# Patient Record
Sex: MALE | Race: WHITE | NOT HISPANIC OR LATINO | URBAN - METROPOLITAN AREA
[De-identification: names, ages, dates, MRNs, and addresses within clinical notes are randomized per-mention and may not be internally consistent; named-entity substitution may affect disease eponyms.]

---

## 2019-01-03 ENCOUNTER — OFFICE (OUTPATIENT)
Dept: URBAN - METROPOLITAN AREA CLINIC 11 | Facility: CLINIC | Age: 19
End: 2019-01-03

## 2019-01-03 VITALS
DIASTOLIC BLOOD PRESSURE: 76 MMHG | SYSTOLIC BLOOD PRESSURE: 117 MMHG | WEIGHT: 175 LBS | HEART RATE: 80 BPM | HEIGHT: 69 IN

## 2019-01-03 DIAGNOSIS — K50.90 CROHN'S DISEASE, UNSPECIFIED, WITHOUT COMPLICATIONS: ICD-10-CM

## 2019-01-03 LAB
C-REACTIVE PROTEIN, QUANT: 8.4 MG/L — HIGH (ref 0–4.9)
CBC, PLATELET, NO DIFFERENTIAL: HEMATOCRIT: 42.8 % (ref 37.5–51)
CBC, PLATELET, NO DIFFERENTIAL: HEMOGLOBIN: 13.5 G/DL (ref 13–17.7)
CBC, PLATELET, NO DIFFERENTIAL: MCH: 27.1 PG (ref 26.6–33)
CBC, PLATELET, NO DIFFERENTIAL: MCHC: 31.5 G/DL (ref 31.5–35.7)
CBC, PLATELET, NO DIFFERENTIAL: MCV: 86 FL (ref 79–97)
CBC, PLATELET, NO DIFFERENTIAL: PLATELETS: 270 X10E3/UL (ref 150–379)
CBC, PLATELET, NO DIFFERENTIAL: RBC: 4.98 X10E6/UL (ref 4.14–5.8)
CBC, PLATELET, NO DIFFERENTIAL: RDW: 17.7 % — HIGH (ref 12.3–15.4)
CBC, PLATELET, NO DIFFERENTIAL: WBC: 10 X10E3/UL (ref 3.4–10.8)
COMP. METABOLIC PANEL (14): A/G RATIO: 1.8 (ref 1.2–2.2)
COMP. METABOLIC PANEL (14): ALBUMIN: 4.6 G/DL (ref 3.5–5.5)
COMP. METABOLIC PANEL (14): ALKALINE PHOSPHATASE: 102 IU/L (ref 39–117)
COMP. METABOLIC PANEL (14): ALT (SGPT): 52 IU/L — HIGH (ref 0–44)
COMP. METABOLIC PANEL (14): AST (SGOT): 32 IU/L (ref 0–40)
COMP. METABOLIC PANEL (14): BILIRUBIN, TOTAL: 0.3 MG/DL (ref 0–1.2)
COMP. METABOLIC PANEL (14): BUN/CREATININE RATIO: 11 (ref 9–20)
COMP. METABOLIC PANEL (14): BUN: 10 MG/DL (ref 6–20)
COMP. METABOLIC PANEL (14): CALCIUM: 10.1 MG/DL (ref 8.7–10.2)
COMP. METABOLIC PANEL (14): CARBON DIOXIDE, TOTAL: 23 MMOL/L (ref 20–29)
COMP. METABOLIC PANEL (14): CHLORIDE: 104 MMOL/L (ref 96–106)
COMP. METABOLIC PANEL (14): CREATININE: 0.89 MG/DL (ref 0.76–1.27)
COMP. METABOLIC PANEL (14): EGFR IF AFRICN AM: 142 ML/MIN/1.73 (ref 59–?)
COMP. METABOLIC PANEL (14): EGFR IF NONAFRICN AM: 123 ML/MIN/1.73 (ref 59–?)
COMP. METABOLIC PANEL (14): GLOBULIN, TOTAL: 2.6 G/DL (ref 1.5–4.5)
COMP. METABOLIC PANEL (14): GLUCOSE: 87 MG/DL (ref 65–99)
COMP. METABOLIC PANEL (14): POTASSIUM: 5 MMOL/L (ref 3.5–5.2)
COMP. METABOLIC PANEL (14): PROTEIN, TOTAL: 7.2 G/DL (ref 6–8.5)
COMP. METABOLIC PANEL (14): SODIUM: 142 MMOL/L (ref 134–144)
HBSAG SCREEN: NEGATIVE
QUANTIFERON-TB GOLD PLUS: NEGATIVE
QUANTIFERON-TB GOLD PLUS: QUANTIFERON CRITERIA: (no result)
QUANTIFERON-TB GOLD PLUS: QUANTIFERON INCUBATION: (no result)
QUANTIFERON-TB GOLD PLUS: QUANTIFERON MITOGEN VALUE: 1.04 IU/ML
QUANTIFERON-TB GOLD PLUS: QUANTIFERON NIL VALUE: 0.05 IU/ML
QUANTIFERON-TB GOLD PLUS: QUANTIFERON TB1 AG VALUE: 0.03 IU/ML
QUANTIFERON-TB GOLD PLUS: QUANTIFERON TB2 AG VALUE: 0.05 IU/ML
SEDIMENTATION RATE-WESTERGREN: 9 MM/HR (ref 0–15)

## 2019-01-03 PROCEDURE — 99244 OFF/OP CNSLTJ NEW/EST MOD 40: CPT | Performed by: INTERNAL MEDICINE

## 2019-01-03 RX ORDER — PREDNISONE 10 MG/1
10 TABLET ORAL
Qty: 20 | Refills: 0 | Status: COMPLETED
End: 2019-01-03

## 2019-01-03 RX ORDER — PREDNISONE 10 MG/1
TABLET ORAL
Qty: 49 | Refills: 0 | Status: COMPLETED
Start: 2019-01-03 | End: 2019-02-13

## 2019-01-15 ENCOUNTER — OFFICE (OUTPATIENT)
Dept: URBAN - METROPOLITAN AREA CLINIC 11 | Facility: CLINIC | Age: 19
End: 2019-01-15

## 2019-01-15 DIAGNOSIS — K50.90 CROHN'S DISEASE, UNSPECIFIED, WITHOUT COMPLICATIONS: ICD-10-CM

## 2019-01-15 PROCEDURE — 96413 CHEMO IV INFUSION 1 HR: CPT | Performed by: INTERNAL MEDICINE

## 2019-01-29 ENCOUNTER — OFFICE (OUTPATIENT)
Dept: URBAN - METROPOLITAN AREA CLINIC 11 | Facility: CLINIC | Age: 19
End: 2019-01-29

## 2019-01-29 DIAGNOSIS — K50.90 CROHN'S DISEASE, UNSPECIFIED, WITHOUT COMPLICATIONS: ICD-10-CM

## 2019-01-29 PROCEDURE — 96413 CHEMO IV INFUSION 1 HR: CPT | Performed by: INTERNAL MEDICINE

## 2019-02-13 ENCOUNTER — OFFICE (OUTPATIENT)
Dept: URBAN - METROPOLITAN AREA CLINIC 11 | Facility: CLINIC | Age: 19
End: 2019-02-13

## 2019-02-13 VITALS
SYSTOLIC BLOOD PRESSURE: 140 MMHG | HEIGHT: 69 IN | HEART RATE: 106 BPM | WEIGHT: 187 LBS | DIASTOLIC BLOOD PRESSURE: 90 MMHG

## 2019-02-13 DIAGNOSIS — K50.90 CROHN'S DISEASE, UNSPECIFIED, WITHOUT COMPLICATIONS: ICD-10-CM

## 2019-02-13 LAB
BASIC METABOLIC PANEL (8): BUN/CREATININE RATIO: 14 (ref 9–20)
BASIC METABOLIC PANEL (8): BUN: 11 MG/DL (ref 6–20)
BASIC METABOLIC PANEL (8): CALCIUM: 9.9 MG/DL (ref 8.7–10.2)
BASIC METABOLIC PANEL (8): CARBON DIOXIDE, TOTAL: 24 MMOL/L (ref 20–29)
BASIC METABOLIC PANEL (8): CHLORIDE: 98 MMOL/L (ref 96–106)
BASIC METABOLIC PANEL (8): CREATININE: 0.79 MG/DL (ref 0.76–1.27)
BASIC METABOLIC PANEL (8): EGFR IF AFRICN AM: 148 ML/MIN/1.73 (ref 59–?)
BASIC METABOLIC PANEL (8): EGFR IF NONAFRICN AM: 128 ML/MIN/1.73 (ref 59–?)
BASIC METABOLIC PANEL (8): GLUCOSE: 77 MG/DL (ref 65–99)
BASIC METABOLIC PANEL (8): POTASSIUM: 4.8 MMOL/L (ref 3.5–5.2)
BASIC METABOLIC PANEL (8): SODIUM: 138 MMOL/L (ref 134–144)
C-REACTIVE PROTEIN, QUANT: 16.8 MG/L — HIGH (ref 0–4.9)

## 2019-02-13 PROCEDURE — 99213 OFFICE O/P EST LOW 20 MIN: CPT | Performed by: INTERNAL MEDICINE

## 2019-02-13 RX ORDER — OMEPRAZOLE 20 MG/1
20 CAPSULE, DELAYED RELEASE ORAL
Qty: 30 | Refills: 11 | Status: COMPLETED
Start: 2019-02-13 | End: 2020-06-22

## 2019-02-13 RX ORDER — NAPROXEN SODIUM 275 MG/1
550 TABLET ORAL
Qty: 60 | Refills: 6 | Status: COMPLETED
Start: 2019-02-13 | End: 2020-06-22

## 2019-02-26 ENCOUNTER — OFFICE (OUTPATIENT)
Dept: URBAN - METROPOLITAN AREA CLINIC 11 | Facility: CLINIC | Age: 19
End: 2019-02-26

## 2019-02-26 DIAGNOSIS — K50.90 CROHN'S DISEASE, UNSPECIFIED, WITHOUT COMPLICATIONS: ICD-10-CM

## 2019-02-26 PROCEDURE — 96413 CHEMO IV INFUSION 1 HR: CPT | Performed by: INTERNAL MEDICINE

## 2019-04-17 ENCOUNTER — OFFICE (OUTPATIENT)
Dept: URBAN - METROPOLITAN AREA CLINIC 11 | Facility: CLINIC | Age: 19
End: 2019-04-17

## 2019-04-17 VITALS
HEART RATE: 87 BPM | DIASTOLIC BLOOD PRESSURE: 68 MMHG | SYSTOLIC BLOOD PRESSURE: 148 MMHG | WEIGHT: 201 LBS | HEIGHT: 68 IN

## 2019-04-17 DIAGNOSIS — M25.50 PAIN IN UNSPECIFIED JOINT: ICD-10-CM

## 2019-04-17 DIAGNOSIS — K50.90 CROHN'S DISEASE, UNSPECIFIED, WITHOUT COMPLICATIONS: ICD-10-CM

## 2019-04-17 DIAGNOSIS — R10.9 UNSPECIFIED ABDOMINAL PAIN: ICD-10-CM

## 2019-04-17 LAB
BILIRUBIN, DIRECT: 0.09 MG/DL (ref 0–0.4)
C-REACTIVE PROTEIN, QUANT: 10.2 MG/L — HIGH (ref 0–4.9)
CBC, PLATELET, NO DIFFERENTIAL: HEMATOCRIT: 33.6 % — LOW (ref 37.5–51)
CBC, PLATELET, NO DIFFERENTIAL: HEMOGLOBIN: 10.7 G/DL — LOW (ref 13–17.7)
CBC, PLATELET, NO DIFFERENTIAL: MCH: 25.5 PG — LOW (ref 26.6–33)
CBC, PLATELET, NO DIFFERENTIAL: MCHC: 31.8 G/DL (ref 31.5–35.7)
CBC, PLATELET, NO DIFFERENTIAL: MCV: 80 FL (ref 79–97)
CBC, PLATELET, NO DIFFERENTIAL: PLATELETS: 298 X10E3/UL (ref 150–379)
CBC, PLATELET, NO DIFFERENTIAL: RBC: 4.19 X10E6/UL (ref 4.14–5.8)
CBC, PLATELET, NO DIFFERENTIAL: RDW: 13.5 % (ref 12.3–15.4)
CBC, PLATELET, NO DIFFERENTIAL: WBC: 8 X10E3/UL (ref 3.4–10.8)
COMP. METABOLIC PANEL (14): A/G RATIO: 1.6 (ref 1.2–2.2)
COMP. METABOLIC PANEL (14): ALBUMIN: 4.2 G/DL (ref 3.5–5.5)
COMP. METABOLIC PANEL (14): ALKALINE PHOSPHATASE: 85 IU/L (ref 39–117)
COMP. METABOLIC PANEL (14): ALT (SGPT): 15 IU/L (ref 0–44)
COMP. METABOLIC PANEL (14): AST (SGOT): 22 IU/L (ref 0–40)
COMP. METABOLIC PANEL (14): BILIRUBIN, TOTAL: 0.3 MG/DL (ref 0–1.2)
COMP. METABOLIC PANEL (14): BUN/CREATININE RATIO: 14 (ref 9–20)
COMP. METABOLIC PANEL (14): BUN: 12 MG/DL (ref 6–20)
COMP. METABOLIC PANEL (14): CALCIUM: 9.3 MG/DL (ref 8.7–10.2)
COMP. METABOLIC PANEL (14): CARBON DIOXIDE, TOTAL: 21 MMOL/L (ref 20–29)
COMP. METABOLIC PANEL (14): CHLORIDE: 105 MMOL/L (ref 96–106)
COMP. METABOLIC PANEL (14): CREATININE: 0.87 MG/DL (ref 0.76–1.27)
COMP. METABOLIC PANEL (14): EGFR IF AFRICN AM: 143 ML/MIN/1.73 (ref 59–?)
COMP. METABOLIC PANEL (14): EGFR IF NONAFRICN AM: 123 ML/MIN/1.73 (ref 59–?)
COMP. METABOLIC PANEL (14): GLOBULIN, TOTAL: 2.7 G/DL (ref 1.5–4.5)
COMP. METABOLIC PANEL (14): GLUCOSE: 95 MG/DL (ref 65–99)
COMP. METABOLIC PANEL (14): POTASSIUM: 4.2 MMOL/L (ref 3.5–5.2)
COMP. METABOLIC PANEL (14): PROTEIN, TOTAL: 6.9 G/DL (ref 6–8.5)
COMP. METABOLIC PANEL (14): SODIUM: 143 MMOL/L (ref 134–144)
SEDIMENTATION RATE-WESTERGREN: 18 MM/HR — HIGH (ref 0–15)

## 2019-04-17 PROCEDURE — 99213 OFFICE O/P EST LOW 20 MIN: CPT | Performed by: INTERNAL MEDICINE

## 2019-04-23 ENCOUNTER — OFFICE (OUTPATIENT)
Dept: URBAN - METROPOLITAN AREA CLINIC 11 | Facility: CLINIC | Age: 19
End: 2019-04-23
Payer: COMMERCIAL

## 2019-04-23 DIAGNOSIS — K50.90 CROHN'S DISEASE, UNSPECIFIED, WITHOUT COMPLICATIONS: ICD-10-CM

## 2019-04-23 PROCEDURE — 96413 CHEMO IV INFUSION 1 HR: CPT | Performed by: INTERNAL MEDICINE

## 2019-04-29 ENCOUNTER — OFFICE (OUTPATIENT)
Dept: URBAN - METROPOLITAN AREA CLINIC 22 | Facility: CLINIC | Age: 19
End: 2019-04-29
Payer: COMMERCIAL

## 2019-04-29 DIAGNOSIS — R10.9 UNSPECIFIED ABDOMINAL PAIN: ICD-10-CM

## 2019-04-29 DIAGNOSIS — K50.90 CROHN'S DISEASE, UNSPECIFIED, WITHOUT COMPLICATIONS: ICD-10-CM

## 2019-04-29 PROCEDURE — 74177 CT ABD & PELVIS W/CONTRAST: CPT | Performed by: INTERNAL MEDICINE

## 2019-05-13 ENCOUNTER — OFFICE (OUTPATIENT)
Dept: URBAN - METROPOLITAN AREA CLINIC 11 | Facility: CLINIC | Age: 19
End: 2019-05-13
Payer: COMMERCIAL

## 2019-05-13 DIAGNOSIS — D50.9 IRON DEFICIENCY ANEMIA, UNSPECIFIED: ICD-10-CM

## 2019-05-13 DIAGNOSIS — T45.4X5A ADVERSE EFFECT OF IRON AND ITS COMPOUNDS, INITIAL ENCOUNTER: ICD-10-CM

## 2019-05-13 PROCEDURE — 96365 THER/PROPH/DIAG IV INF INIT: CPT | Performed by: INTERNAL MEDICINE

## 2019-05-20 ENCOUNTER — OFFICE (OUTPATIENT)
Dept: URBAN - METROPOLITAN AREA CLINIC 11 | Facility: CLINIC | Age: 19
End: 2019-05-20
Payer: COMMERCIAL

## 2019-05-20 DIAGNOSIS — T45.4X5A ADVERSE EFFECT OF IRON AND ITS COMPOUNDS, INITIAL ENCOUNTER: ICD-10-CM

## 2019-05-20 DIAGNOSIS — D50.9 IRON DEFICIENCY ANEMIA, UNSPECIFIED: ICD-10-CM

## 2019-05-20 PROCEDURE — 96365 THER/PROPH/DIAG IV INF INIT: CPT | Performed by: INTERNAL MEDICINE

## 2019-06-18 ENCOUNTER — OFFICE (OUTPATIENT)
Dept: URBAN - METROPOLITAN AREA CLINIC 11 | Facility: CLINIC | Age: 19
End: 2019-06-18

## 2019-06-18 DIAGNOSIS — K50.90 CROHN'S DISEASE, UNSPECIFIED, WITHOUT COMPLICATIONS: ICD-10-CM

## 2019-06-18 PROCEDURE — 96413 CHEMO IV INFUSION 1 HR: CPT | Performed by: INTERNAL MEDICINE

## 2019-08-12 ENCOUNTER — OFFICE (OUTPATIENT)
Dept: URBAN - METROPOLITAN AREA CLINIC 11 | Facility: CLINIC | Age: 19
End: 2019-08-12

## 2019-08-12 ENCOUNTER — OFFICE (OUTPATIENT)
Dept: URBAN - METROPOLITAN AREA CLINIC 11 | Facility: CLINIC | Age: 19
End: 2019-08-12
Payer: COMMERCIAL

## 2019-08-12 DIAGNOSIS — K50.90 CROHN'S DISEASE, UNSPECIFIED, WITHOUT COMPLICATIONS: ICD-10-CM

## 2019-08-12 PROCEDURE — 96413 CHEMO IV INFUSION 1 HR: CPT | Performed by: INTERNAL MEDICINE

## 2019-08-14 ENCOUNTER — OFFICE (OUTPATIENT)
Dept: URBAN - METROPOLITAN AREA CLINIC 11 | Facility: CLINIC | Age: 19
End: 2019-08-14

## 2019-08-14 VITALS
SYSTOLIC BLOOD PRESSURE: 146 MMHG | HEIGHT: 68 IN | DIASTOLIC BLOOD PRESSURE: 77 MMHG | WEIGHT: 185 LBS | HEART RATE: 86 BPM

## 2019-08-14 DIAGNOSIS — M25.50 PAIN IN UNSPECIFIED JOINT: ICD-10-CM

## 2019-08-14 DIAGNOSIS — R19.7 DIARRHEA, UNSPECIFIED: ICD-10-CM

## 2019-08-14 DIAGNOSIS — K50.90 CROHN'S DISEASE, UNSPECIFIED, WITHOUT COMPLICATIONS: ICD-10-CM

## 2019-08-14 DIAGNOSIS — R10.9 UNSPECIFIED ABDOMINAL PAIN: ICD-10-CM

## 2019-08-14 PROCEDURE — 99213 OFFICE O/P EST LOW 20 MIN: CPT | Performed by: INTERNAL MEDICINE

## 2019-08-14 RX ORDER — CHOLESTYRAMINE 4 G/9G
4 POWDER, FOR SUSPENSION ORAL
Qty: 30 | Refills: 5 | Status: COMPLETED
Start: 2019-08-14 | End: 2020-06-22

## 2019-08-14 RX ORDER — SODIUM PICOSULFATE, MAGNESIUM OXIDE, AND ANHYDROUS CITRIC ACID 10; 3.5; 12 MG/160ML; G/160ML; G/160ML
LIQUID ORAL
Qty: 1 | Refills: 0 | Status: COMPLETED
Start: 2019-08-14 | End: 2019-08-26

## 2019-08-20 LAB
C DIFFICILE TOXINS A+B, EIA: NEGATIVE
GIARDIA, EIA, OVA/PARASITE: GIARDIA LAMBLIA AG, EIA: NEGATIVE
GIARDIA, EIA, OVA/PARASITE: OVA + PARASITE EXAM: (no result)
LACTOFERRIN, FECAL, QUANT.: 5.5 UG/ML(G) (ref 0–7.24)
RESULT: RESULT 1: (no result)

## 2019-08-26 ENCOUNTER — OFFICE (OUTPATIENT)
Dept: URBAN - METROPOLITAN AREA PATHOLOGY 22 | Facility: PATHOLOGY | Age: 19
End: 2019-08-26
Payer: COMMERCIAL

## 2019-08-26 ENCOUNTER — AMBULATORY SURGICAL CENTER (OUTPATIENT)
Dept: URBAN - METROPOLITAN AREA SURGERY 3 | Facility: SURGERY | Age: 19
End: 2019-08-26
Payer: COMMERCIAL

## 2019-08-26 VITALS
DIASTOLIC BLOOD PRESSURE: 69 MMHG | DIASTOLIC BLOOD PRESSURE: 62 MMHG | DIASTOLIC BLOOD PRESSURE: 64 MMHG | TEMPERATURE: 98 F | SYSTOLIC BLOOD PRESSURE: 115 MMHG | HEART RATE: 105 BPM | SYSTOLIC BLOOD PRESSURE: 115 MMHG | DIASTOLIC BLOOD PRESSURE: 69 MMHG | HEART RATE: 98 BPM | DIASTOLIC BLOOD PRESSURE: 64 MMHG | RESPIRATION RATE: 18 BRPM | HEART RATE: 79 BPM | OXYGEN SATURATION: 95 % | SYSTOLIC BLOOD PRESSURE: 116 MMHG | RESPIRATION RATE: 16 BRPM | SYSTOLIC BLOOD PRESSURE: 114 MMHG | TEMPERATURE: 98 F | WEIGHT: 182 LBS | RESPIRATION RATE: 16 BRPM | SYSTOLIC BLOOD PRESSURE: 115 MMHG | SYSTOLIC BLOOD PRESSURE: 117 MMHG | RESPIRATION RATE: 14 BRPM | HEART RATE: 98 BPM | TEMPERATURE: 98 F | RESPIRATION RATE: 14 BRPM | SYSTOLIC BLOOD PRESSURE: 117 MMHG | HEART RATE: 79 BPM | DIASTOLIC BLOOD PRESSURE: 62 MMHG | SYSTOLIC BLOOD PRESSURE: 117 MMHG | HEART RATE: 97 BPM | RESPIRATION RATE: 14 BRPM | RESPIRATION RATE: 16 BRPM | DIASTOLIC BLOOD PRESSURE: 71 MMHG | RESPIRATION RATE: 20 BRPM | WEIGHT: 182 LBS | SYSTOLIC BLOOD PRESSURE: 114 MMHG | RESPIRATION RATE: 20 BRPM | SYSTOLIC BLOOD PRESSURE: 124 MMHG | TEMPERATURE: 97.4 F | SYSTOLIC BLOOD PRESSURE: 116 MMHG | SYSTOLIC BLOOD PRESSURE: 114 MMHG | RESPIRATION RATE: 18 BRPM | HEART RATE: 82 BPM | DIASTOLIC BLOOD PRESSURE: 62 MMHG | DIASTOLIC BLOOD PRESSURE: 71 MMHG | RESPIRATION RATE: 20 BRPM | TEMPERATURE: 97.4 F | SYSTOLIC BLOOD PRESSURE: 124 MMHG | SYSTOLIC BLOOD PRESSURE: 116 MMHG | HEART RATE: 98 BPM | HEART RATE: 82 BPM | TEMPERATURE: 97.4 F | DIASTOLIC BLOOD PRESSURE: 64 MMHG | HEIGHT: 71 IN | OXYGEN SATURATION: 95 % | HEIGHT: 71 IN | OXYGEN SATURATION: 100 % | WEIGHT: 182 LBS | SYSTOLIC BLOOD PRESSURE: 124 MMHG | DIASTOLIC BLOOD PRESSURE: 69 MMHG | HEIGHT: 71 IN | DIASTOLIC BLOOD PRESSURE: 71 MMHG | OXYGEN SATURATION: 100 % | HEART RATE: 105 BPM | HEART RATE: 79 BPM | HEART RATE: 97 BPM | OXYGEN SATURATION: 100 % | RESPIRATION RATE: 18 BRPM | OXYGEN SATURATION: 95 % | HEART RATE: 105 BPM | HEART RATE: 82 BPM | HEART RATE: 97 BPM

## 2019-08-26 DIAGNOSIS — K64.0 FIRST DEGREE HEMORRHOIDS: ICD-10-CM

## 2019-08-26 DIAGNOSIS — T18.4XXA FOREIGN BODY IN COLON, INITIAL ENCOUNTER: ICD-10-CM

## 2019-08-26 DIAGNOSIS — K50.90 CROHN'S DISEASE, UNSPECIFIED, WITHOUT COMPLICATIONS: ICD-10-CM

## 2019-08-26 PROBLEM — K52.89 INFLAMMATORY BOWEL DISEASE OF THE INTESTINE IF MORE PRECISE DIAGNOSIS OR DETERMINATION OF THE EXTENT/SEVERITY OF ACTIVITY OF DISEASE WILL INFLUENCE IMMEDIATE/FUTURE MANAGEMENT: Status: ACTIVE | Noted: 2019-08-26

## 2019-08-26 PROCEDURE — 88342 IMHCHEM/IMCYTCHM 1ST ANTB: CPT | Performed by: INTERNAL MEDICINE

## 2019-08-26 PROCEDURE — 45379 COLONOSCOPY W/FB REMOVAL: CPT | Mod: 59 | Performed by: INTERNAL MEDICINE

## 2019-08-26 PROCEDURE — 88305 TISSUE EXAM BY PATHOLOGIST: CPT | Performed by: INTERNAL MEDICINE

## 2019-08-26 PROCEDURE — 45380 COLONOSCOPY AND BIOPSY: CPT | Performed by: INTERNAL MEDICINE

## 2019-08-26 NOTE — SERVICEHPINOTES
Mr. Aviles is a 20yo M that presents for follow-up regarding a complicated course for Crohn's disease. He establish care to see me in early January 2019. At that time he had recently undergone bowel resection for Crohn's disease in [place] and was presenting to establish care in [place]. He also was seeing Dr. Mayer regarding reversal of his ostomy. I spoke with Dr. Mayer and ultimately we decided to go ahead and continue his biologic therapy. Prior to his discharge from the hospital in [place] he had been started on Entyvio. He had previously been on Humira and Remicade and seemingly failed these. Therefore, we continue this and he has been on the entyvio. He has been doing well other than some ongoing joint pain. I suspect his joint symptoms had previously been controlled with his anti TNF however now on the Entyvio, given its more GI specific mechanism of action, his joint symptoms have not been well controlled. He did undergo reversal of his ostomy in May of this year. I do not have those records but apparently he says everything went well. Since that time he has had 5-6 bowel movements per day. He denies any blood in his stool. He denies any nocturnal bowel movements. He generally feels fatigued. He does take NSAIDs. BR

## 2019-08-26 NOTE — SERVICEHPINOTES
Mr. Aviles is a 20yo M that presents for follow-up regarding a complicated course for Crohn's disease. He establish care to see me in early January 2019. At that time he had recently undergone bowel resection for Crohn's disease in [place] and was presenting to establish care in [place]. He also was seeing Dr. Mayer regarding reversal of his ostomy. I spoke with Dr. Mayer and ultimately we decided to go ahead and continue his biologic therapy. Prior to his discharge from the hospital in [place] he had been started on Entyvio. He had previously been on Humira and Remicade and seemingly failed these. Therefore, we continue this and he has been on the entyvio. He has been doing well other than some ongoing joint pain. I suspect his joint symptoms had previously been controlled with his anti TNF however now on the Entyvio, given its more GI specific mechanism of action, his joint symptoms have not been well controlled. He did undergo reversal of his ostomy in May of this year. I do not have those records but apparently he says everything went well. Since that time he has had 5-6 bowel movements per day. He denies any blood in his stool. He denies any nocturnal bowel movements. He generally feels fatigued. He does take NSAIDs. BR

## 2019-08-26 NOTE — SERVICEHPINOTES
Mr. Aviles is a 18yo M that presents for follow-up regarding a complicated course for Crohn's disease. He establish care to see me in early January 2019. At that time he had recently undergone bowel resection for Crohn's disease in [place] and was presenting to establish care in [place]. He also was seeing Dr. Mayer regarding reversal of his ostomy. I spoke with Dr. Mayer and ultimately we decided to go ahead and continue his biologic therapy. Prior to his discharge from the hospital in [place] he had been started on Entyvio. He had previously been on Humira and Remicade and seemingly failed these. Therefore, we continue this and he has been on the entyvio. He has been doing well other than some ongoing joint pain. I suspect his joint symptoms had previously been controlled with his anti TNF however now on the Entyvio, given its more GI specific mechanism of action, his joint symptoms have not been well controlled. He did undergo reversal of his ostomy in May of this year. I do not have those records but apparently he says everything went well. Since that time he has had 5-6 bowel movements per day. He denies any blood in his stool. He denies any nocturnal bowel movements. He generally feels fatigued. He does take NSAIDs. BR

## 2019-10-10 ENCOUNTER — OFFICE (OUTPATIENT)
Dept: URBAN - METROPOLITAN AREA CLINIC 11 | Facility: CLINIC | Age: 19
End: 2019-10-10
Payer: COMMERCIAL

## 2019-10-10 DIAGNOSIS — K50.90 CROHN'S DISEASE, UNSPECIFIED, WITHOUT COMPLICATIONS: ICD-10-CM

## 2019-10-10 PROCEDURE — 96413 CHEMO IV INFUSION 1 HR: CPT | Performed by: INTERNAL MEDICINE

## 2019-12-05 ENCOUNTER — OFFICE (OUTPATIENT)
Dept: URBAN - METROPOLITAN AREA CLINIC 11 | Facility: CLINIC | Age: 19
End: 2019-12-05
Payer: COMMERCIAL

## 2019-12-05 DIAGNOSIS — K50.90 CROHN'S DISEASE, UNSPECIFIED, WITHOUT COMPLICATIONS: ICD-10-CM

## 2019-12-05 PROCEDURE — 96413 CHEMO IV INFUSION 1 HR: CPT | Performed by: INTERNAL MEDICINE

## 2020-01-07 ENCOUNTER — OFFICE (OUTPATIENT)
Dept: URBAN - METROPOLITAN AREA CLINIC 11 | Facility: CLINIC | Age: 20
End: 2020-01-07

## 2020-01-07 VITALS
WEIGHT: 191 LBS | DIASTOLIC BLOOD PRESSURE: 69 MMHG | SYSTOLIC BLOOD PRESSURE: 116 MMHG | HEART RATE: 78 BPM | HEIGHT: 71 IN

## 2020-01-07 DIAGNOSIS — R19.4 CHANGE IN BOWEL HABIT: ICD-10-CM

## 2020-01-07 DIAGNOSIS — K42.9 UMBILICAL HERNIA WITHOUT OBSTRUCTION OR GANGRENE: ICD-10-CM

## 2020-01-07 DIAGNOSIS — K50.90 CROHN'S DISEASE, UNSPECIFIED, WITHOUT COMPLICATIONS: ICD-10-CM

## 2020-01-07 DIAGNOSIS — M25.50 PAIN IN UNSPECIFIED JOINT: ICD-10-CM

## 2020-01-07 PROCEDURE — 99213 OFFICE O/P EST LOW 20 MIN: CPT | Performed by: INTERNAL MEDICINE

## 2020-01-25 ENCOUNTER — INPATIENT HOSPITAL (OUTPATIENT)
Dept: URBAN - METROPOLITAN AREA HOSPITAL 95 | Facility: HOSPITAL | Age: 20
End: 2020-01-25
Payer: COMMERCIAL

## 2020-01-25 DIAGNOSIS — K50.012 CROHN'S DISEASE OF SMALL INTESTINE WITH INTESTINAL OBSTRUCTI: ICD-10-CM

## 2020-01-25 PROCEDURE — 99221 1ST HOSP IP/OBS SF/LOW 40: CPT | Performed by: SPECIALIST

## 2020-01-26 PROCEDURE — 99231 SBSQ HOSP IP/OBS SF/LOW 25: CPT | Performed by: SPECIALIST

## 2020-01-28 ENCOUNTER — OFFICE (OUTPATIENT)
Dept: URBAN - METROPOLITAN AREA CLINIC 11 | Facility: CLINIC | Age: 20
End: 2020-01-28
Payer: COMMERCIAL

## 2020-01-28 ENCOUNTER — OFFICE (OUTPATIENT)
Dept: URBAN - METROPOLITAN AREA CLINIC 11 | Facility: CLINIC | Age: 20
End: 2020-01-28

## 2020-01-28 VITALS
HEIGHT: 71 IN | WEIGHT: 193 LBS | DIASTOLIC BLOOD PRESSURE: 76 MMHG | SYSTOLIC BLOOD PRESSURE: 135 MMHG | HEART RATE: 78 BPM

## 2020-01-28 DIAGNOSIS — M25.50 PAIN IN UNSPECIFIED JOINT: ICD-10-CM

## 2020-01-28 DIAGNOSIS — R19.7 DIARRHEA, UNSPECIFIED: ICD-10-CM

## 2020-01-28 DIAGNOSIS — K50.90 CROHN'S DISEASE, UNSPECIFIED, WITHOUT COMPLICATIONS: ICD-10-CM

## 2020-01-28 DIAGNOSIS — K42.9 UMBILICAL HERNIA WITHOUT OBSTRUCTION OR GANGRENE: ICD-10-CM

## 2020-01-28 LAB
C DIFFICILE TOXINS A+B, EIA: NEGATIVE
RESULT: RESULT 1: (no result)
RESULT: RESULT 1: (no result)
STOOL CULTURE: CAMPYLOBACTER CULTURE: (no result)
STOOL CULTURE: E COLI SHIGA TOXIN EIA: NEGATIVE
STOOL CULTURE: SALMONELLA/SHIGELLA SCREEN: (no result)

## 2020-01-28 PROCEDURE — 96413 CHEMO IV INFUSION 1 HR: CPT | Performed by: INTERNAL MEDICINE

## 2020-01-28 PROCEDURE — 99213 OFFICE O/P EST LOW 20 MIN: CPT | Mod: 25 | Performed by: INTERNAL MEDICINE

## 2020-01-28 RX ORDER — PREDNISONE 10 MG/1
TABLET ORAL
Qty: 119 | Refills: 0 | Status: COMPLETED
Start: 2020-01-28 | End: 2020-06-22

## 2020-03-25 ENCOUNTER — OFFICE (OUTPATIENT)
Dept: URBAN - METROPOLITAN AREA CLINIC 11 | Facility: CLINIC | Age: 20
End: 2020-03-25
Payer: COMMERCIAL

## 2020-03-25 DIAGNOSIS — K50.90 CROHN'S DISEASE, UNSPECIFIED, WITHOUT COMPLICATIONS: ICD-10-CM

## 2020-03-25 PROCEDURE — 96413 CHEMO IV INFUSION 1 HR: CPT | Performed by: INTERNAL MEDICINE

## 2020-05-11 ENCOUNTER — OFFICE (OUTPATIENT)
Dept: URBAN - METROPOLITAN AREA CLINIC 11 | Facility: CLINIC | Age: 20
End: 2020-05-11
Payer: COMMERCIAL

## 2020-05-11 DIAGNOSIS — K50.90 CROHN'S DISEASE, UNSPECIFIED, WITHOUT COMPLICATIONS: ICD-10-CM

## 2020-05-11 PROCEDURE — 96413 CHEMO IV INFUSION 1 HR: CPT | Performed by: INTERNAL MEDICINE

## 2020-06-22 ENCOUNTER — OFFICE (OUTPATIENT)
Dept: URBAN - METROPOLITAN AREA TELEHEALTH 10 | Facility: TELEHEALTH | Age: 20
End: 2020-06-22

## 2020-06-22 VITALS — HEIGHT: 71 IN

## 2020-06-22 DIAGNOSIS — R19.7 DIARRHEA, UNSPECIFIED: ICD-10-CM

## 2020-06-22 DIAGNOSIS — K50.90 CROHN'S DISEASE, UNSPECIFIED, WITHOUT COMPLICATIONS: ICD-10-CM

## 2020-06-22 DIAGNOSIS — M25.50 PAIN IN UNSPECIFIED JOINT: ICD-10-CM

## 2020-06-22 NOTE — SERVICEHPINOTES
Mr. Aviles is a 21yo M that presents for follow-up regarding a complicated course for Crohn's disease. A synopsis of his history is as outlined below.  Most recently he was seen at [facility] for a 2nd opinion and it was recommended his Entyvio be increased to every 6 weeks.  He had antibody levels checked and these were negative.  So far he has received one infusion so far at this time interval and is due for his 2nd infusion soon.  However, he currently is working in [facility] and is working on establishing with a gastroenterologist there so he can get his infusions.  He reports that overall he says he is doing well. He has had some more frequent stools and no longer having issues with constipation. However he has noticed some urgency.  He will occasionally see some blood with wiping.  He says he had similar symptoms in the past and did a course of mesalamine suppositories which led to resolution of his symptoms.  He denies any abdominal pain or gary watery diarrhea.  We discussed MR enterography in the past and this was also recommended to him at [facility] however this has not yet been obtained. He was going to have this performed here but then it was ordered at [facility] so he was going to have it performed there, however due to the COVID-19 pandemic he did not travel to [facility] and therefore has not had this test performed as of yet.  PRIOR HISTORY:BRIt appears he was 1st diagnosed with Crohn's disease in 2011 around age 11. At that time, this was diagnosed on colonoscopy. It appears he had colonic involvement although later it was mentioned he also possibly had gastric involvement as well. Regardless he was on several different types of medications including mesalamine, prednisone, methotrexate, and Imuran at different points. It appears he never responded exceedingly well to these but ultimately was started on Remicade. Apparently he had a very good response to Remicade was on this for several years and his other medications were tapered off. He was on Remicade monotherapy for the last many years with good control and limited symptoms. He currently is a student at [employer] and established care with Dr. Cabrera in [facility]. Apparently he was complaining of some mild symptoms given his history a colonoscopy was performed in May of 2018. This demonstrated active Crohn's disease in the cecum and he was noted to have inflammation and a stricture in his terminal ileum with an ulcer. Remicade levels and antibodies were checked and his level was found to be in the therapeutic range without any antibodies. Obviously, this was indicative that the patient was failing Remicade given the active disease. He was therefore switched to Humira. He said after starting Humira he essentially had no response and continue to worsen. He was then switched back to Remicade at higher and more frequent dosing however never became back under control. The plan was apparently to start a is different biologic therapy however while this was being planned he was visiting family in [facility] over the Thanksgiving holiday 2018, and developed significant symptoms with abdominal pain and fever. He went to a local hospital there and it appears he was diagnosed with an abscess related to his Crohn's disease. He had a complicated hospitalization and was in the hospital for nearly a month. In addition antibiotic therapy he underwent percutaneous drainage of the abscess with good results. He was apparently dosed with Entyvio but when he was about to be discharged he developed a recurrent fever and a CT scan was repeated demonstrating another abscess. Ultimately, one of the abscesses could not be adequately drained and he ultimately required surgery. He was seen by Colorectal surgery at that hospital and underwent what sounds to be a right hemicolectomy or ileocecectomy. He says that he was told about 1 foot of his small bowel was removed and about 8 inches of his right colon was removed. He was told he might have some involvement of other parts of his small-bowel but this was not removed. He was given an end ileostomy which functioned well. He was started on a prednisone taper. When I initially saw him in clinic we continued the Entyvio which had already been started at discharge from [place] and he followed up with General surgery here, Dr. Leodan Mayer.I spoke with Dr. Mayer and ultimately we decided to go ahead and continue his biologic therapy.  Therefore, we continued Entyvio since he had already denise started on this. His major issue at least initially after surgery was joint pain. I suspect his joint symptoms had previously been controlled with his anti-TNF however now on the Entyvio, given its more GI specific mechanism of action, his joint symptoms have not been well controlled. He did undergo reversal of his ostomy in May 2019. Initially he was having some loose stools which seem consistent with bile salt diarrhea and responded to cholestyramine. In follow-up he actually had some constipation type symptoms. He was taking MiraLax which helped. I did perform a repeat colonoscopy in August 2019 that demonstrated some mild inflammation at his anastomosis and some patchy erythema throughout the transverse and sigmoid colon. On biopsy, this did demonstrate some active colitis on biopsies. I discussed options with him and suggested possibly adding further immunomodulating therapy such as methotrexate. That being said he was very reluctant to start methotrexate and did not want to move forward with this as he previously said he did not tolerate it well when he had been treated with this as a teenager. He was also being seen by Rheumatology for his skeletal pain symptoms and ultimately was recommended he start sulfasalazine. Apparently he was started on this medication but subsequently lost the prescription and stopped it. When I last saw him he was going to call the rheumatologist about restarting this but apparently this did not occur and he never restarted this medication.  In January 2020 he developed some worsening abdominal pain at his ventral hernia site. He went to [facility] and had a CT scan which demonstrated some enteritis adjacent to the ventral hernia. The imaging was discussed with Radiology and surgery as well as GI input and was ultimately felt this likely represented a Crohn's disease flare as opposed to related to the hernia itself. He was therefore admitted and started on prednisone. He was discharged on 5 days of prednisone 40 mg and followed up with me in clinic and was doing better overall.  He was moving to [place] for semester and we had discussed getting a 2nd opinion regarding his inflammatory bowel disease and he did establish care with [facility] and saw Dr. Kumar there.  It was recommended his Entyvio be increased to every 6 weeks.

## 2020-08-17 ENCOUNTER — OFFICE (OUTPATIENT)
Dept: URBAN - METROPOLITAN AREA CLINIC 11 | Facility: CLINIC | Age: 20
End: 2020-08-17
Payer: COMMERCIAL

## 2020-08-17 DIAGNOSIS — K50.90 CROHN'S DISEASE, UNSPECIFIED, WITHOUT COMPLICATIONS: ICD-10-CM

## 2020-08-17 PROCEDURE — 96413 CHEMO IV INFUSION 1 HR: CPT | Performed by: INTERNAL MEDICINE

## 2020-09-28 ENCOUNTER — OFFICE (OUTPATIENT)
Dept: URBAN - METROPOLITAN AREA CLINIC 11 | Facility: CLINIC | Age: 20
End: 2020-09-28
Payer: COMMERCIAL

## 2020-09-28 DIAGNOSIS — K50.90 CROHN'S DISEASE, UNSPECIFIED, WITHOUT COMPLICATIONS: ICD-10-CM

## 2020-09-28 PROCEDURE — 96413 CHEMO IV INFUSION 1 HR: CPT | Performed by: INTERNAL MEDICINE

## 2020-11-10 ENCOUNTER — OFFICE (OUTPATIENT)
Dept: URBAN - METROPOLITAN AREA CLINIC 11 | Facility: CLINIC | Age: 20
End: 2020-11-10
Payer: COMMERCIAL

## 2020-11-10 DIAGNOSIS — K50.90 CROHN'S DISEASE, UNSPECIFIED, WITHOUT COMPLICATIONS: ICD-10-CM

## 2020-11-10 PROCEDURE — 96413 CHEMO IV INFUSION 1 HR: CPT | Performed by: INTERNAL MEDICINE

## 2020-12-23 ENCOUNTER — OFFICE (OUTPATIENT)
Dept: URBAN - METROPOLITAN AREA CLINIC 11 | Facility: CLINIC | Age: 20
End: 2020-12-23
Payer: COMMERCIAL

## 2020-12-23 DIAGNOSIS — K50.90 CROHN'S DISEASE, UNSPECIFIED, WITHOUT COMPLICATIONS: ICD-10-CM

## 2020-12-23 PROCEDURE — 96413 CHEMO IV INFUSION 1 HR: CPT | Performed by: INTERNAL MEDICINE

## 2021-02-01 ENCOUNTER — OFFICE (OUTPATIENT)
Dept: URBAN - METROPOLITAN AREA CLINIC 11 | Facility: CLINIC | Age: 21
End: 2021-02-01

## 2021-02-01 DIAGNOSIS — K50.90 CROHN'S DISEASE, UNSPECIFIED, WITHOUT COMPLICATIONS: ICD-10-CM

## 2021-02-01 PROCEDURE — 96413 CHEMO IV INFUSION 1 HR: CPT | Performed by: INTERNAL MEDICINE

## 2021-03-15 ENCOUNTER — OFFICE (OUTPATIENT)
Dept: URBAN - METROPOLITAN AREA CLINIC 11 | Facility: CLINIC | Age: 21
End: 2021-03-15
Payer: COMMERCIAL

## 2021-03-15 DIAGNOSIS — K50.90 CROHN'S DISEASE, UNSPECIFIED, WITHOUT COMPLICATIONS: ICD-10-CM

## 2021-03-15 PROCEDURE — 96413 CHEMO IV INFUSION 1 HR: CPT | Performed by: INTERNAL MEDICINE

## 2021-03-17 ENCOUNTER — OFFICE (OUTPATIENT)
Dept: URBAN - METROPOLITAN AREA CLINIC 11 | Facility: CLINIC | Age: 21
End: 2021-03-17

## 2021-04-30 ENCOUNTER — OFFICE (OUTPATIENT)
Dept: URBAN - METROPOLITAN AREA CLINIC 11 | Facility: CLINIC | Age: 21
End: 2021-04-30
Payer: COMMERCIAL

## 2021-04-30 DIAGNOSIS — K50.90 CROHN'S DISEASE, UNSPECIFIED, WITHOUT COMPLICATIONS: ICD-10-CM

## 2021-04-30 PROCEDURE — 96413 CHEMO IV INFUSION 1 HR: CPT | Performed by: INTERNAL MEDICINE

## 2021-06-11 ENCOUNTER — OFFICE (OUTPATIENT)
Dept: URBAN - METROPOLITAN AREA CLINIC 11 | Facility: CLINIC | Age: 21
End: 2021-06-11
Payer: COMMERCIAL

## 2021-06-11 DIAGNOSIS — K50.90 CROHN'S DISEASE, UNSPECIFIED, WITHOUT COMPLICATIONS: ICD-10-CM

## 2021-06-11 PROCEDURE — 96413 CHEMO IV INFUSION 1 HR: CPT | Performed by: INTERNAL MEDICINE

## 2021-07-23 ENCOUNTER — OFFICE (OUTPATIENT)
Dept: URBAN - METROPOLITAN AREA CLINIC 11 | Facility: CLINIC | Age: 21
End: 2021-07-23
Payer: COMMERCIAL

## 2021-07-23 DIAGNOSIS — K50.90 CROHN'S DISEASE, UNSPECIFIED, WITHOUT COMPLICATIONS: ICD-10-CM

## 2021-07-23 PROCEDURE — 96413 CHEMO IV INFUSION 1 HR: CPT | Performed by: INTERNAL MEDICINE

## 2021-09-03 ENCOUNTER — OFFICE (OUTPATIENT)
Dept: URBAN - METROPOLITAN AREA CLINIC 11 | Facility: CLINIC | Age: 21
End: 2021-09-03
Payer: COMMERCIAL

## 2021-09-03 DIAGNOSIS — K50.90 CROHN'S DISEASE, UNSPECIFIED, WITHOUT COMPLICATIONS: ICD-10-CM

## 2021-09-03 PROCEDURE — 96413 CHEMO IV INFUSION 1 HR: CPT | Performed by: INTERNAL MEDICINE

## 2021-10-11 ENCOUNTER — OFFICE (OUTPATIENT)
Dept: URBAN - METROPOLITAN AREA CLINIC 11 | Facility: CLINIC | Age: 21
End: 2021-10-11
Payer: COMMERCIAL

## 2021-10-11 DIAGNOSIS — K50.90 CROHN'S DISEASE, UNSPECIFIED, WITHOUT COMPLICATIONS: ICD-10-CM

## 2021-10-11 PROCEDURE — 96413 CHEMO IV INFUSION 1 HR: CPT | Performed by: INTERNAL MEDICINE

## 2021-11-22 ENCOUNTER — OFFICE (OUTPATIENT)
Dept: URBAN - METROPOLITAN AREA CLINIC 11 | Facility: CLINIC | Age: 21
End: 2021-11-22
Payer: COMMERCIAL

## 2021-11-22 DIAGNOSIS — K50.90 CROHN'S DISEASE, UNSPECIFIED, WITHOUT COMPLICATIONS: ICD-10-CM

## 2021-11-22 PROCEDURE — 96413 CHEMO IV INFUSION 1 HR: CPT | Performed by: INTERNAL MEDICINE

## 2022-01-04 ENCOUNTER — OFFICE (OUTPATIENT)
Dept: URBAN - METROPOLITAN AREA CLINIC 11 | Facility: CLINIC | Age: 22
End: 2022-01-04

## 2022-01-04 DIAGNOSIS — K50.90 CROHN'S DISEASE, UNSPECIFIED, WITHOUT COMPLICATIONS: ICD-10-CM

## 2022-01-04 PROCEDURE — 96413 CHEMO IV INFUSION 1 HR: CPT | Performed by: INTERNAL MEDICINE

## 2022-02-14 ENCOUNTER — OFFICE (OUTPATIENT)
Dept: URBAN - METROPOLITAN AREA CLINIC 11 | Facility: CLINIC | Age: 22
End: 2022-02-14
Payer: COMMERCIAL

## 2022-02-14 DIAGNOSIS — K50.90 CROHN'S DISEASE, UNSPECIFIED, WITHOUT COMPLICATIONS: ICD-10-CM

## 2022-02-14 PROCEDURE — 96413 CHEMO IV INFUSION 1 HR: CPT | Performed by: INTERNAL MEDICINE

## 2022-02-28 ENCOUNTER — OFFICE (OUTPATIENT)
Dept: URBAN - METROPOLITAN AREA CLINIC 11 | Facility: CLINIC | Age: 22
End: 2022-02-28

## 2022-02-28 VITALS — HEIGHT: 71 IN

## 2022-02-28 DIAGNOSIS — K50.90 CROHN'S DISEASE, UNSPECIFIED, WITHOUT COMPLICATIONS: ICD-10-CM

## 2022-02-28 NOTE — SERVICENOTES
The duration of the direct videoconferencing portion of the telehealth visit was 5 minutes and 15 seconds.  over 15 minutes were spent total preparing and performing documentation for this visit.

## 2022-02-28 NOTE — SERVICEHPINOTES
Mr. Aviles is a 23yo M that presents for follow-up regarding a complicated course for Crohn's disease. A synopsis of his history is as outlined below.   He has been followed at [facility] for his Crohn's disease it may have been managing this.  He continues to get his infusions here.  At last visit he was getting his Entyvio every 6 weeks. He has been on  Other medications such as methotrexate and Imuran but did not get any significant further benefit and has had some issues with tolerance of these.  He has been on steroids several times in the past as well and obviously we want to avoid further steroid exposure.  He does note that he recently moved to [place] since graduating college and he will be establishing care there and will continue to follow-up at [facility] also.  Otherwise, he says he is doing well.  He has had some increased symptoms recently regarding his bowel habits but attributes this to stress with his move and does not appear to be having any severe symptoms.  He is overall doing well.  He says he had a colonoscopy at [facility] recently that was reportedly stable. he does continue to have his issues with joint pain but this has been stable also. PRIOR HISTORY:brIt appears he was 1st diagnosed with Crohn's disease in 2011 around age 11. At that time, this was diagnosed on colonoscopy. It appears he had colonic involvement although later it was mentioned he also possibly had gastric involvement as well. Regardless he was on several different types of medications including mesalamine, prednisone, methotrexate, and Imuran at different points. It appears he never responded exceedingly well to these but ultimately was started on Remicade. Apparently he had a very good response to Remicade was on this for several years and his other medications were tapered off. He was on Remicade monotherapy for the last many years with good control and limited symptoms. He currently is a student at [employer] and established care with Dr. Cabrera in [facility]. Apparently he was complaining of some mild symptoms given his history a colonoscopy was performed in May of 2018. This demonstrated active Crohn's disease in the cecum and he was noted to have inflammation and a stricture in his terminal ileum with an ulcer. Remicade levels and antibodies were checked and his level was found to be in the therapeutic range without any antibodies. Obviously, this was indicative that the patient was failing Remicade given the active disease. He was therefore switched to Humira. He said after starting Humira he essentially had no response and continue to worsen. He was then switched back to Remicade at higher and more frequent dosing however never became back under control. The plan was apparently to start a is different biologic therapy however while this was being planned he was visiting family in [facility] over the Thanksgiving holiday 2018, and developed significant symptoms with abdominal pain and fever. He went to a local hospital there and it appears he was diagnosed with an abscess related to his Crohn's disease. He had a complicated hospitalization and was in the hospital for nearly a month. In addition antibiotic therapy he underwent percutaneous drainage of the abscess with good results. He was apparently dosed with Entyvio but when he was about to be discharged he developed a recurrent fever and a CT scan was repeated demonstrating another abscess. Ultimately, one of the abscesses could not be adequately drained and he ultimately required surgery. He was seen by Colorectal surgery at that hospital and underwent what sounds to be a right hemicolectomy or ileocecectomy. He says that he was told about 1 foot of his small bowel was removed and about 8 inches of his right colon was removed. He was told he might have some involvement of other parts of his small-bowel but this was not removed. He was given an end ileostomy which functioned well. He was started on a prednisone taper. When I initially saw him in clinic we continued the Entyvio which had already been started at discharge from [place] and he followed up with General surgery here, Dr. Leodan Mayer.I spoke with Dr. Mayer and ultimately we decided to go ahead and continue his biologic therapy.  Therefore, we continued Entyvio since he had already denise started on this. His major issue at least initially after surgery was joint pain. I suspect his joint symptoms had previously been controlled with his anti-TNF however now on the Entyvio, given its more GI specific mechanism of action, his joint symptoms have not been well controlled. He did undergo reversal of his ostomy in May 2019. Initially he was having some loose stools which seem consistent with bile salt diarrhea and responded to cholestyramine. In follow-up he actually had some constipation type symptoms. He was taking MiraLax which helped. I did perform a repeat colonoscopy in August 2019 that demonstrated some mild inflammation at his anastomosis and some patchy erythema throughout the transverse and sigmoid colon. On biopsy, this did demonstrate some active colitis on biopsies. I discussed options with him and suggested possibly adding further immunomodulating therapy such as methotrexate. That being said he was very reluctant to start methotrexate and did not want to move forward with this as he previously said he did not tolerate it well when he had been treated with this as a teenager. He was also being seen by Rheumatology for his skeletal pain symptoms and ultimately was recommended he start sulfasalazine. Apparently he was started on this medication but subsequently lost the prescription and stopped it. When I last saw him he was going to call the rheumatologist about restarting this but apparently this did not occur and he never restarted this medication.  In January 2020 he developed some worsening abdominal pain at his ventral hernia site. He went to [facility] Emergency Department and had a CT scan which demonstrated some enteritis adjacent to the ventral hernia. The imaging was discussed with Radiology and surgery as well as GI input and was ultimately felt this likely represented a Crohn's disease flare as opposed to related to the hernia itself. He was therefore admitted and started on prednisone. He was discharged on 5 days of prednisone 40 mg and followed up with me in clinic and was doing better overall.  He was moving to [place] for semester and we had discussed getting a 2nd opinion regarding his inflammatory bowel disease and he did establish care with [facility] and saw Dr. Kumar there.  It was recommended his Entyvio be increased to every 6 weeks.

## 2022-04-25 ENCOUNTER — OFFICE (OUTPATIENT)
Dept: URBAN - METROPOLITAN AREA CLINIC 11 | Facility: CLINIC | Age: 22
End: 2022-04-25

## 2022-04-25 VITALS
DIASTOLIC BLOOD PRESSURE: 72 MMHG | HEART RATE: 74 BPM | HEART RATE: 90 BPM | SYSTOLIC BLOOD PRESSURE: 125 MMHG | DIASTOLIC BLOOD PRESSURE: 67 MMHG | HEIGHT: 71 IN | RESPIRATION RATE: 18 BRPM | DIASTOLIC BLOOD PRESSURE: 71 MMHG | TEMPERATURE: 97.9 F | TEMPERATURE: 98.5 F | SYSTOLIC BLOOD PRESSURE: 140 MMHG

## 2022-04-25 DIAGNOSIS — K50.90 CROHN'S DISEASE, UNSPECIFIED, WITHOUT COMPLICATIONS: ICD-10-CM

## 2022-04-25 LAB
CBC, PLATELET, NO DIFFERENTIAL: HEMATOCRIT: 40.2 % (ref 37.5–51)
CBC, PLATELET, NO DIFFERENTIAL: HEMOGLOBIN: 13.2 G/DL (ref 13–17.7)
CBC, PLATELET, NO DIFFERENTIAL: MCH: 30 PG (ref 26.6–33)
CBC, PLATELET, NO DIFFERENTIAL: MCHC: 32.8 G/DL (ref 31.5–35.7)
CBC, PLATELET, NO DIFFERENTIAL: MCV: 91 FL (ref 79–97)
CBC, PLATELET, NO DIFFERENTIAL: PLATELETS: 273 X10E3/UL (ref 150–450)
CBC, PLATELET, NO DIFFERENTIAL: RBC: 4.4 X10E6/UL (ref 4.14–5.8)
CBC, PLATELET, NO DIFFERENTIAL: RDW: 12.4 % (ref 11.6–15.4)
CBC, PLATELET, NO DIFFERENTIAL: WBC: 7.6 X10E3/UL (ref 3.4–10.8)
COMP. METABOLIC PANEL (14): A/G RATIO: 1.8 (ref 1.2–2.2)
COMP. METABOLIC PANEL (14): ALBUMIN: 4.4 G/DL (ref 4.1–5.2)
COMP. METABOLIC PANEL (14): ALKALINE PHOSPHATASE: 89 IU/L (ref 44–121)
COMP. METABOLIC PANEL (14): ALT (SGPT): 21 IU/L (ref 0–44)
COMP. METABOLIC PANEL (14): AST (SGOT): 21 IU/L (ref 0–40)
COMP. METABOLIC PANEL (14): BILIRUBIN, TOTAL: 0.3 MG/DL (ref 0–1.2)
COMP. METABOLIC PANEL (14): BUN/CREATININE RATIO: 16 (ref 9–20)
COMP. METABOLIC PANEL (14): BUN: 14 MG/DL (ref 6–20)
COMP. METABOLIC PANEL (14): CALCIUM: 9.3 MG/DL (ref 8.7–10.2)
COMP. METABOLIC PANEL (14): CARBON DIOXIDE, TOTAL: 22 MMOL/L (ref 20–29)
COMP. METABOLIC PANEL (14): CHLORIDE: 99 MMOL/L (ref 96–106)
COMP. METABOLIC PANEL (14): CREATININE: 0.88 MG/DL (ref 0.76–1.27)
COMP. METABOLIC PANEL (14): EGFR: 123 ML/MIN/1.73 (ref 59–?)
COMP. METABOLIC PANEL (14): GLOBULIN, TOTAL: 2.4 G/DL (ref 1.5–4.5)
COMP. METABOLIC PANEL (14): GLUCOSE: 108 MG/DL — HIGH (ref 65–99)
COMP. METABOLIC PANEL (14): POTASSIUM: 4.3 MMOL/L (ref 3.5–5.2)
COMP. METABOLIC PANEL (14): PROTEIN, TOTAL: 6.8 G/DL (ref 6–8.5)
COMP. METABOLIC PANEL (14): SODIUM: 137 MMOL/L (ref 134–144)

## 2022-04-25 PROCEDURE — 96413 CHEMO IV INFUSION 1 HR: CPT | Performed by: INTERNAL MEDICINE

## 2022-04-25 NOTE — SERVICEHPINOTES
See follow up visit from 6/28/2022.brDate / Results of last TB test 11/22/2021. - negative. brLabs / Specific orders: CBC and CMP due in October. Drawn todaybrInsurance: [employer] approved [place] for continuation of Entyvio 06/10/21-06/10/22 (BB)brPharmacy: Buy and BillbrNegative coronavirus screeningbrRate of Infusion: Standard

## 2022-05-25 ENCOUNTER — WEB ENCOUNTER (OUTPATIENT)
Dept: URBAN - METROPOLITAN AREA CLINIC 98 | Facility: CLINIC | Age: 22
End: 2022-05-25

## 2022-05-25 ENCOUNTER — TELEPHONE ENCOUNTER (OUTPATIENT)
Dept: URBAN - METROPOLITAN AREA CLINIC 98 | Facility: CLINIC | Age: 22
End: 2022-05-25

## 2022-05-25 ENCOUNTER — OFFICE VISIT (OUTPATIENT)
Dept: URBAN - METROPOLITAN AREA CLINIC 98 | Facility: CLINIC | Age: 22
End: 2022-05-25
Payer: COMMERCIAL

## 2022-05-25 VITALS
SYSTOLIC BLOOD PRESSURE: 132 MMHG | BODY MASS INDEX: 28.06 KG/M2 | TEMPERATURE: 97 F | WEIGHT: 196 LBS | HEIGHT: 70 IN | DIASTOLIC BLOOD PRESSURE: 84 MMHG | HEART RATE: 78 BPM

## 2022-05-25 DIAGNOSIS — K50.118 CROHN'S DISEASE OF COLON WITH OTHER COMPLICATION: ICD-10-CM

## 2022-05-25 DIAGNOSIS — K50.819 CROHN'S DISEASE OF SMALL AND LARGE INTESTINES WITH COMPLICATION: ICD-10-CM

## 2022-05-25 PROBLEM — 71833008: Status: ACTIVE | Noted: 2022-05-25

## 2022-05-25 PROCEDURE — 99204 OFFICE O/P NEW MOD 45 MIN: CPT | Performed by: INTERNAL MEDICINE

## 2022-05-25 RX ORDER — VEDOLIZUMAB 300 MG/5ML
AS DIRECTED INJECTION, POWDER, LYOPHILIZED, FOR SOLUTION INTRAVENOUS
Qty: 1 | Refills: 5 | OUTPATIENT

## 2022-05-25 NOTE — HPI-TODAY'S VISIT:
Crohn's since age 10 Has been on Entyvio for 3 years Entyvio q 6 wks Followed at [facility] Was on Remicade, stopped working. Was on MTX in past. Was on imuran. Had ileal resection 3 years ago and started Entyvio. Hospitalized for perforation and sepsis in [facility]. Has moved to [facility] Entyvio is working well at this time. alana in [facility] ER for mild flare and now on budesonide taper

## 2022-05-26 ENCOUNTER — TELEPHONE ENCOUNTER (OUTPATIENT)
Dept: URBAN - METROPOLITAN AREA CLINIC 98 | Facility: CLINIC | Age: 22
End: 2022-05-26

## 2022-06-07 ENCOUNTER — LAB OUTSIDE AN ENCOUNTER (OUTPATIENT)
Dept: URBAN - METROPOLITAN AREA CLINIC 98 | Facility: CLINIC | Age: 22
End: 2022-06-07

## 2022-06-09 LAB
A/G RATIO: 1.5
ALBUMIN: 4.6
ALKALINE PHOSPHATASE: 106
ALT (SGPT): 22
AST (SGOT): 21
BILIRUBIN, TOTAL: 0.4
BUN/CREATININE RATIO: 15
BUN: 13
C-REACTIVE PROTEIN, CARDIAC: 11.6
CALCIUM: 9.9
CARBON DIOXIDE, TOTAL: 24
CHLORIDE: 101
CREATININE: 0.87
EGFR: 124
GLOBULIN, TOTAL: 3
GLUCOSE: 87
HEMATOCRIT: 42.5
HEMOGLOBIN: 14
MCH: 29.8
MCHC: 32.9
MCV: 90
NRBC: (no result)
PLATELETS: 280
POTASSIUM: 4.3
PROTEIN, TOTAL: 7.6
QUANTIFERON CRITERIA: (no result)
QUANTIFERON INCUBATION: (no result)
QUANTIFERON MITOGEN VALUE: >10
QUANTIFERON NIL VALUE: 0
QUANTIFERON TB1 AG VALUE: 0
QUANTIFERON TB2 AG VALUE: 0
QUANTIFERON-TB GOLD PLUS: NEGATIVE
RBC: 4.7
RDW: 12.5
SODIUM: 139
WBC: 7.3

## 2022-06-15 ENCOUNTER — OFFICE VISIT (OUTPATIENT)
Dept: URBAN - METROPOLITAN AREA CLINIC 98 | Facility: CLINIC | Age: 22
End: 2022-06-15

## 2022-06-15 RX ORDER — VEDOLIZUMAB 300 MG/5ML
AS DIRECTED INJECTION, POWDER, LYOPHILIZED, FOR SOLUTION INTRAVENOUS
Qty: 1 | Refills: 5 | Status: ACTIVE | COMMUNITY

## 2022-07-05 ENCOUNTER — DASHBOARD ENCOUNTERS (OUTPATIENT)
Age: 22
End: 2022-07-05

## 2022-07-07 ENCOUNTER — OFFICE VISIT (OUTPATIENT)
Dept: URBAN - METROPOLITAN AREA CLINIC 98 | Facility: CLINIC | Age: 22
End: 2022-07-07

## 2022-07-07 VITALS — HEIGHT: 70 IN | WEIGHT: 196 LBS | BODY MASS INDEX: 28.06 KG/M2

## 2022-07-07 RX ORDER — VEDOLIZUMAB 300 MG/5ML
AS DIRECTED INJECTION, POWDER, LYOPHILIZED, FOR SOLUTION INTRAVENOUS
Qty: 1 | Refills: 5 | Status: ACTIVE | COMMUNITY

## 2022-07-13 ENCOUNTER — OFFICE VISIT (OUTPATIENT)
Dept: URBAN - METROPOLITAN AREA CLINIC 97 | Facility: CLINIC | Age: 22
End: 2022-07-13
Payer: COMMERCIAL

## 2022-07-13 VITALS
WEIGHT: 201 LBS | RESPIRATION RATE: 18 BRPM | BODY MASS INDEX: 28.77 KG/M2 | SYSTOLIC BLOOD PRESSURE: 135 MMHG | HEART RATE: 84 BPM | DIASTOLIC BLOOD PRESSURE: 86 MMHG | TEMPERATURE: 96.3 F | HEIGHT: 70 IN

## 2022-07-13 DIAGNOSIS — K50.80 CROHN'S COLITIS: ICD-10-CM

## 2022-07-13 PROCEDURE — 96413 CHEMO IV INFUSION 1 HR: CPT | Performed by: INTERNAL MEDICINE

## 2022-07-13 RX ORDER — VEDOLIZUMAB 300 MG/5ML
AS DIRECTED INJECTION, POWDER, LYOPHILIZED, FOR SOLUTION INTRAVENOUS
Qty: 1 | Refills: 5 | Status: ACTIVE | COMMUNITY

## 2022-08-11 PROBLEM — 71833008 CROHN'S DISEASE OF SMALL AND LARGE INTESTINES: Status: ACTIVE | Noted: 2022-08-11

## 2022-09-19 ENCOUNTER — TELEPHONE ENCOUNTER (OUTPATIENT)
Dept: URBAN - METROPOLITAN AREA CLINIC 97 | Facility: CLINIC | Age: 22
End: 2022-09-19

## 2022-09-19 PROBLEM — 50440006: Status: ACTIVE | Noted: 2022-05-25

## 2022-09-28 ENCOUNTER — OFFICE (OUTPATIENT)
Dept: URBAN - METROPOLITAN AREA CLINIC 11 | Facility: CLINIC | Age: 22
End: 2022-09-28

## 2022-09-28 VITALS
RESPIRATION RATE: 18 BRPM | DIASTOLIC BLOOD PRESSURE: 82 MMHG | DIASTOLIC BLOOD PRESSURE: 83 MMHG | TEMPERATURE: 98.3 F | HEART RATE: 96 BPM | HEART RATE: 102 BPM | SYSTOLIC BLOOD PRESSURE: 141 MMHG | HEIGHT: 71 IN | HEART RATE: 110 BPM | SYSTOLIC BLOOD PRESSURE: 139 MMHG | DIASTOLIC BLOOD PRESSURE: 90 MMHG

## 2022-09-28 DIAGNOSIS — K50.90 CROHN'S DISEASE, UNSPECIFIED, WITHOUT COMPLICATIONS: ICD-10-CM

## 2022-09-28 PROCEDURE — 96413 CHEMO IV INFUSION 1 HR: CPT | Performed by: INTERNAL MEDICINE

## 2022-09-28 NOTE — SERVICEHPINOTES
See follow up visit from  2/28/2022   .  brDate / Results of last TB test  11/22/2021   -   Negative  , drawn today.  brLabs and/or Additional orders: CBC &amp CMP due in March, drawn today.brInsurance authorization: [employer] Approved PA for continuation of buy and bill Entyvio - 6/11/22 - 6/10/23.brPharmacy:  Buy [facility] Bill  brRate of Infusion:  Standard   
br   Infusion order placed on:  9/27/2022   
br

## 2022-10-03 ENCOUNTER — TELEPHONE ENCOUNTER (OUTPATIENT)
Dept: URBAN - METROPOLITAN AREA CLINIC 97 | Facility: CLINIC | Age: 22
End: 2022-10-03

## 2022-10-06 ENCOUNTER — TELEPHONE ENCOUNTER (OUTPATIENT)
Dept: URBAN - METROPOLITAN AREA CLINIC 97 | Facility: CLINIC | Age: 22
End: 2022-10-06

## 2022-11-09 ENCOUNTER — OFFICE (OUTPATIENT)
Dept: URBAN - METROPOLITAN AREA CLINIC 11 | Facility: CLINIC | Age: 22
End: 2022-11-09
Payer: COMMERCIAL

## 2022-11-09 VITALS
SYSTOLIC BLOOD PRESSURE: 139 MMHG | DIASTOLIC BLOOD PRESSURE: 86 MMHG | DIASTOLIC BLOOD PRESSURE: 85 MMHG | HEART RATE: 93 BPM | HEIGHT: 71 IN | TEMPERATURE: 98.2 F | HEART RATE: 97 BPM | HEART RATE: 90 BPM | DIASTOLIC BLOOD PRESSURE: 75 MMHG | SYSTOLIC BLOOD PRESSURE: 138 MMHG | SYSTOLIC BLOOD PRESSURE: 132 MMHG | TEMPERATURE: 98.6 F | RESPIRATION RATE: 18 BRPM

## 2022-11-09 DIAGNOSIS — K50.90 CROHN'S DISEASE, UNSPECIFIED, WITHOUT COMPLICATIONS: ICD-10-CM

## 2022-11-09 PROCEDURE — 96413 CHEMO IV INFUSION 1 HR: CPT | Performed by: INTERNAL MEDICINE

## 2022-11-09 NOTE — SERVICEHPINOTES
See follow up visit from 2/28/2022 .brDate / Results of last TB test 9/28/22 - Negative.brLabs and/or Additional orders: CBC &amp CMP due in March.brInsurance authorization: [facility] Approved PA for continuation of Entyvio 10/26/2210/25/23 (BB)brPharmacy: Buy and BillbrRate of Infusion: StandardbrInfusion order placed on: 9/27/2022

## 2022-12-13 ENCOUNTER — OFFICE (OUTPATIENT)
Dept: URBAN - METROPOLITAN AREA CLINIC 11 | Facility: CLINIC | Age: 22
End: 2022-12-13

## 2022-12-13 VITALS
HEART RATE: 91 BPM | HEIGHT: 71 IN | SYSTOLIC BLOOD PRESSURE: 130 MMHG | DIASTOLIC BLOOD PRESSURE: 74 MMHG | WEIGHT: 203 LBS | OXYGEN SATURATION: 99 %

## 2022-12-13 DIAGNOSIS — M25.50 PAIN IN UNSPECIFIED JOINT: ICD-10-CM

## 2022-12-13 DIAGNOSIS — K50.90 CROHN'S DISEASE, UNSPECIFIED, WITHOUT COMPLICATIONS: ICD-10-CM

## 2022-12-13 DIAGNOSIS — R19.7 DIARRHEA, UNSPECIFIED: ICD-10-CM

## 2022-12-13 PROCEDURE — 99214 OFFICE O/P EST MOD 30 MIN: CPT | Performed by: INTERNAL MEDICINE

## 2022-12-14 LAB
C-REACTIVE PROTEIN, QUANT: 11 MG/L — HIGH (ref 0–10)
CBC, PLATELET, NO DIFFERENTIAL: HEMATOCRIT: 43.8 % (ref 37.5–51)
CBC, PLATELET, NO DIFFERENTIAL: HEMOGLOBIN: 14.5 G/DL (ref 13–17.7)
CBC, PLATELET, NO DIFFERENTIAL: MCH: 30.4 PG (ref 26.6–33)
CBC, PLATELET, NO DIFFERENTIAL: MCHC: 33.1 G/DL (ref 31.5–35.7)
CBC, PLATELET, NO DIFFERENTIAL: MCV: 92 FL (ref 79–97)
CBC, PLATELET, NO DIFFERENTIAL: NRBC: (no result)
CBC, PLATELET, NO DIFFERENTIAL: PLATELETS: 285 X10E3/UL (ref 150–450)
CBC, PLATELET, NO DIFFERENTIAL: RBC: 4.77 X10E6/UL (ref 4.14–5.8)
CBC, PLATELET, NO DIFFERENTIAL: RDW: 12.7 % (ref 11.6–15.4)
CBC, PLATELET, NO DIFFERENTIAL: WBC: 6.4 X10E3/UL (ref 3.4–10.8)
COMP. METABOLIC PANEL (14): A/G RATIO: 1.8 (ref 1.2–2.2)
COMP. METABOLIC PANEL (14): ALBUMIN: 4.7 G/DL (ref 4.1–5.2)
COMP. METABOLIC PANEL (14): ALKALINE PHOSPHATASE: 106 IU/L (ref 44–121)
COMP. METABOLIC PANEL (14): ALT (SGPT): 23 IU/L (ref 0–44)
COMP. METABOLIC PANEL (14): AST (SGOT): 27 IU/L (ref 0–40)
COMP. METABOLIC PANEL (14): BILIRUBIN, TOTAL: 0.5 MG/DL (ref 0–1.2)
COMP. METABOLIC PANEL (14): BUN/CREATININE RATIO: 13 (ref 9–20)
COMP. METABOLIC PANEL (14): BUN: 11 MG/DL (ref 6–20)
COMP. METABOLIC PANEL (14): CALCIUM: 9.7 MG/DL (ref 8.7–10.2)
COMP. METABOLIC PANEL (14): CARBON DIOXIDE, TOTAL: 22 MMOL/L (ref 20–29)
COMP. METABOLIC PANEL (14): CHLORIDE: 102 MMOL/L (ref 96–106)
COMP. METABOLIC PANEL (14): CREATININE: 0.87 MG/DL (ref 0.76–1.27)
COMP. METABOLIC PANEL (14): EGFR: 124 ML/MIN/1.73 (ref 59–?)
COMP. METABOLIC PANEL (14): GLOBULIN, TOTAL: 2.6 G/DL (ref 1.5–4.5)
COMP. METABOLIC PANEL (14): GLUCOSE: 82 MG/DL (ref 70–99)
COMP. METABOLIC PANEL (14): POTASSIUM: 4.6 MMOL/L (ref 3.5–5.2)
COMP. METABOLIC PANEL (14): PROTEIN, TOTAL: 7.3 G/DL (ref 6–8.5)
COMP. METABOLIC PANEL (14): SODIUM: 140 MMOL/L (ref 134–144)
QUANTIFERON-TB GOLD PLUS: (no result)
QUANTIFERON-TB GOLD PLUS: QUANTIFERON INCUBATION: (no result)
REQUEST PROBLEM: (no result)
SEDIMENTATION RATE-WESTERGREN: 17 MM/HR — HIGH (ref 0–15)

## 2022-12-16 ENCOUNTER — OFFICE (OUTPATIENT)
Dept: URBAN - METROPOLITAN AREA CLINIC 11 | Facility: CLINIC | Age: 22
End: 2022-12-16
Payer: COMMERCIAL

## 2022-12-16 VITALS
HEART RATE: 110 BPM | SYSTOLIC BLOOD PRESSURE: 135 MMHG | DIASTOLIC BLOOD PRESSURE: 75 MMHG | DIASTOLIC BLOOD PRESSURE: 85 MMHG | DIASTOLIC BLOOD PRESSURE: 87 MMHG | HEIGHT: 71 IN | HEART RATE: 115 BPM | SYSTOLIC BLOOD PRESSURE: 141 MMHG | TEMPERATURE: 98.7 F | HEART RATE: 100 BPM | RESPIRATION RATE: 18 BRPM | TEMPERATURE: 97.9 F | SYSTOLIC BLOOD PRESSURE: 113 MMHG

## 2022-12-16 DIAGNOSIS — K50.90 CROHN'S DISEASE, UNSPECIFIED, WITHOUT COMPLICATIONS: ICD-10-CM

## 2022-12-16 PROCEDURE — 96413 CHEMO IV INFUSION 1 HR: CPT | Performed by: INTERNAL MEDICINE

## 2022-12-16 NOTE — SERVICEHPINOTES
See follow up visit from  12/13/2022   .  brDate / Results of last TB test  9/28/2022   -   Negative  brLabs and/or Additional orders: CBC, CMP due in JunebrInsurance authorization: [facility] Approved PA for continuation of Entyvio 10/26/22-10/25/23 (BB)brPharmacy:  Buy and Bill  brRate of Infusion:  Standard   
br   Infusion order placed on:  9/27/2022   
br

## 2023-01-27 ENCOUNTER — OFFICE (OUTPATIENT)
Dept: URBAN - METROPOLITAN AREA CLINIC 11 | Facility: CLINIC | Age: 23
End: 2023-01-27
Payer: COMMERCIAL

## 2023-01-27 VITALS
HEART RATE: 97 BPM | DIASTOLIC BLOOD PRESSURE: 76 MMHG | DIASTOLIC BLOOD PRESSURE: 82 MMHG | HEART RATE: 104 BPM | SYSTOLIC BLOOD PRESSURE: 137 MMHG | HEART RATE: 110 BPM | SYSTOLIC BLOOD PRESSURE: 120 MMHG | TEMPERATURE: 97.8 F | TEMPERATURE: 98 F | HEIGHT: 71 IN | SYSTOLIC BLOOD PRESSURE: 130 MMHG | RESPIRATION RATE: 18 BRPM | DIASTOLIC BLOOD PRESSURE: 88 MMHG

## 2023-01-27 DIAGNOSIS — K50.90 CROHN'S DISEASE, UNSPECIFIED, WITHOUT COMPLICATIONS: ICD-10-CM

## 2023-01-27 PROCEDURE — 96413 CHEMO IV INFUSION 1 HR: CPT | Performed by: INTERNAL MEDICINE

## 2023-01-27 NOTE — SERVICEHPINOTES
See follow up visit from  12/13/2022   .  brDate / Results of last TB test  9/28/2022   -   Negative  brLabs and/or Additional orders: CBC &amp CMP due in June.brInsurance authorization: Primary [facility] Approved PA for continuation of Entyvio 10/26/2210/25/23 (BB). x 9 infusions of Q6W.brPharmacy:  Buy and Bill  brRate of Infusion:  Standard   
br   Infusion order placed on:  9/27/2022   
br

## 2023-03-06 ENCOUNTER — OFFICE (OUTPATIENT)
Dept: URBAN - METROPOLITAN AREA CLINIC 11 | Facility: CLINIC | Age: 23
End: 2023-03-06
Payer: COMMERCIAL

## 2023-03-06 VITALS
HEART RATE: 95 BPM | TEMPERATURE: 98.2 F | HEART RATE: 85 BPM | RESPIRATION RATE: 18 BRPM | SYSTOLIC BLOOD PRESSURE: 140 MMHG | TEMPERATURE: 98.5 F | DIASTOLIC BLOOD PRESSURE: 64 MMHG | DIASTOLIC BLOOD PRESSURE: 82 MMHG | HEART RATE: 87 BPM | HEIGHT: 71 IN | SYSTOLIC BLOOD PRESSURE: 128 MMHG | SYSTOLIC BLOOD PRESSURE: 150 MMHG | DIASTOLIC BLOOD PRESSURE: 83 MMHG

## 2023-03-06 DIAGNOSIS — K50.90 CROHN'S DISEASE, UNSPECIFIED, WITHOUT COMPLICATIONS: ICD-10-CM

## 2023-03-06 PROCEDURE — 96413 CHEMO IV INFUSION 1 HR: CPT | Performed by: INTERNAL MEDICINE

## 2023-03-06 NOTE — SERVICENOTES
----- Message from YEYO Martinez sent at 8/6/2022  9:22 AM CDT -----  Xray is normal   Next Entyvio infusion is on  4/17/23 at 11:00am

## 2023-03-06 NOTE — SERVICEHPINOTES
See follow up visit from 12/13/2022 .brDate / Results of last TB test 9/28/2022 - NegativebrLabs and/or Additional orders: CBC &amp CMP due in June.brInsurance authorization: [facility] Approved PA for continuation of Entyvio 10/26/2210/25/23 (BB). x 9 infusions of Q6W.brPharmacy: Buy and BillbrRate of Infusion: StandardbrInfusion order placed on: 9/27/2022

## 2023-04-17 ENCOUNTER — OFFICE (OUTPATIENT)
Dept: URBAN - METROPOLITAN AREA CLINIC 11 | Facility: CLINIC | Age: 23
End: 2023-04-17
Payer: COMMERCIAL

## 2023-04-17 VITALS
HEART RATE: 90 BPM | TEMPERATURE: 97.8 F | DIASTOLIC BLOOD PRESSURE: 78 MMHG | HEIGHT: 71 IN | HEART RATE: 100 BPM | DIASTOLIC BLOOD PRESSURE: 76 MMHG | RESPIRATION RATE: 18 BRPM | SYSTOLIC BLOOD PRESSURE: 127 MMHG | SYSTOLIC BLOOD PRESSURE: 130 MMHG | DIASTOLIC BLOOD PRESSURE: 80 MMHG | HEART RATE: 106 BPM | TEMPERATURE: 98.2 F | SYSTOLIC BLOOD PRESSURE: 125 MMHG

## 2023-04-17 DIAGNOSIS — K50.90 CROHN'S DISEASE, UNSPECIFIED, WITHOUT COMPLICATIONS: ICD-10-CM

## 2023-04-17 PROCEDURE — 96413 CHEMO IV INFUSION 1 HR: CPT | Performed by: INTERNAL MEDICINE

## 2023-04-17 NOTE — SERVICEHPINOTES
See follow up visit from  12/13/2022   .  brDate / Results of last TB test  9/28/2022   -   Negative  brLabs and/or Additional orders: CBC &amp CMP due in June brInsurance authorization: Primary [facility] Approved PA for continuation of Entyvio 10/26/2210/25/23 (BB)brPharmacy:  Buy and Bill  brRate of Infusion:  Standard   
br   Infusion order placed on:  9/27/2022   
br

## 2023-05-26 ENCOUNTER — OFFICE (OUTPATIENT)
Dept: URBAN - METROPOLITAN AREA CLINIC 11 | Facility: CLINIC | Age: 23
End: 2023-05-26
Payer: COMMERCIAL

## 2023-05-26 VITALS
HEART RATE: 89 BPM | RESPIRATION RATE: 18 BRPM | SYSTOLIC BLOOD PRESSURE: 112 MMHG | DIASTOLIC BLOOD PRESSURE: 83 MMHG | DIASTOLIC BLOOD PRESSURE: 68 MMHG | TEMPERATURE: 97.8 F | TEMPERATURE: 98.8 F | HEART RATE: 70 BPM | SYSTOLIC BLOOD PRESSURE: 133 MMHG | HEART RATE: 92 BPM | DIASTOLIC BLOOD PRESSURE: 77 MMHG | HEIGHT: 71 IN | SYSTOLIC BLOOD PRESSURE: 119 MMHG

## 2023-05-26 DIAGNOSIS — K50.90 CROHN'S DISEASE, UNSPECIFIED, WITHOUT COMPLICATIONS: ICD-10-CM

## 2023-05-26 PROCEDURE — 96413 CHEMO IV INFUSION 1 HR: CPT | Performed by: INTERNAL MEDICINE

## 2023-05-26 NOTE — SERVICEHPINOTES
See follow up visit from  12/13/2022   .  brDate / Results of last TB test  9/28/2022   -   Negative  brLabs and/or Additional orders: CBC and CMP due in November drawn todaybrInsurance authorization:Primary [facility] Approved PA for continuation of Entyvio 10/26/2210/25/23 (BB)brPharmacy:  Buy and Bill  brRate of Infusion:  Standard   
br   Infusion order placed on:  9/27/2022   
br

## 2023-06-13 ENCOUNTER — OFFICE (OUTPATIENT)
Dept: URBAN - METROPOLITAN AREA CLINIC 11 | Facility: CLINIC | Age: 23
End: 2023-06-13

## 2023-06-13 VITALS
HEIGHT: 71 IN | WEIGHT: 185 LBS | DIASTOLIC BLOOD PRESSURE: 68 MMHG | SYSTOLIC BLOOD PRESSURE: 121 MMHG | OXYGEN SATURATION: 100 % | HEART RATE: 98 BPM

## 2023-06-13 DIAGNOSIS — K50.90 CROHN'S DISEASE, UNSPECIFIED, WITHOUT COMPLICATIONS: ICD-10-CM

## 2023-06-13 PROCEDURE — 99212 OFFICE O/P EST SF 10 MIN: CPT | Performed by: INTERNAL MEDICINE

## 2023-06-13 NOTE — SERVICEHPINOTES
Mr. Aviles is a 22yo M that presents for follow-up regarding a complicated course for Crohn's disease. A synopsis of his history is as outlined below.   He has been followed at [facility] for his Crohn's disease it may have been managing this.  He continues to get his infusions here.  At last visit he was getting his Entyvio every 6 weeks. He has been on other medications such as methotrexate and Imuran but did not get any significant further benefit and has had some issues with tolerance of these.  He has been on steroids several times in the past as well and obviously we want to avoid further steroid exposure.  Over last several years he did take a job in [place] and was working there but returned back to clinic here in December 2022 after he moved back to [place] as he was working remotely.  He continued to follow up at [facility] and had a recent colonoscopy there.  When I saw him last he continued his Entyvio  and his symptoms were largely controlled other than his issues with the joint pain which persisted but were tolerable.  We checked a CMP and CBC here at his last infusion  A couple weeks ago and these were normal. otherwise, he says he is doing well.  He has not had any issues from a GI tract standpoint.  He will occasionally have some mild changes but this seems to be more related to dietary issues and largely he is doing very well.  He denies any evidence of any GI tract blood loss or abdominal pain symptoms.PRIOR HISTORY:brIt appears he was 1st diagnosed with Crohn's disease in 2011 around age 11. At that time, this was diagnosed on colonoscopy. It appears he had colonic involvement although later it was mentioned he also possibly had gastric involvement as well. Regardless he was on several different types of medications including mesalamine, prednisone, methotrexate, and Imuran at different points. It appears he never responded exceedingly well to these but ultimately was started on Remicade. Apparently he had a very good response to Remicade was on this for several years and his other medications were tapered off. He was on Remicade monotherapy for the last many years with good control and limited symptoms. He currently is a student at [employer] and established care with Dr. Cabrera in [facility]. Apparently he was complaining of some mild symptoms given his history a colonoscopy was performed in May of 2018. This demonstrated active Crohn's disease in the cecum and he was noted to have inflammation and a stricture in his terminal ileum with an ulcer. Remicade levels and antibodies were checked and his level was found to be in the therapeutic range without any antibodies. Obviously, this was indicative that the patient was failing Remicade given the active disease. He was therefore switched to Humira. He said after starting Humira he essentially had no response and continue to worsen. He was then switched back to Remicade at higher and more frequent dosing however never became back under control. The plan was apparently to start a is different biologic therapy however while this was being planned he was visiting family in [place] over the Thanksgiving holiday 2018, and developed significant symptoms with abdominal pain and fever. He went to a local hospital there and it appears he was diagnosed with an abscess related to his Crohn's disease. He had a complicated hospitalization and was in the hospital for nearly a month. In addition antibiotic therapy he underwent percutaneous drainage of the abscess with good results. He was apparently dosed with Entyvio but when he was about to be discharged he developed a recurrent fever and a CT scan was repeated demonstrating another abscess. Ultimately, one of the abscesses could not be adequately drained and he ultimately required surgery. He was seen by Colorectal surgery at that hospital and underwent what sounds to be a right hemicolectomy or ileocecectomy. He says that he was told about 1 foot of his small bowel was removed and about 8 inches of his right colon was removed. He was told he might have some involvement of other parts of his small-bowel but this was not removed. He was given an end ileostomy which functioned well. He was started on a prednisone taper. When I initially saw him in clinic we continued the Entyvio which had already been started at discharge from [facility] and he followed up with General surgery here, Dr. Leodan Mayer.I spoke with Dr. Mayer and ultimately we decided to go ahead and continue his biologic therapy.  Therefore, we continued Entyvio since he had already denise started on this. His major issue at least initially after surgery was joint pain. I suspect his joint symptoms had previously been controlled with his anti-TNF however now on the Entyvio, given its more GI specific mechanism of action, his joint symptoms have not been well controlled. He did undergo reversal of his ostomy in May 2019. Initially he was having some loose stools which seem consistent with bile salt diarrhea and responded to cholestyramine. In follow-up he actually had some constipation type symptoms. He was taking MiraLax which helped. I did perform a repeat colonoscopy in August 2019 that demonstrated some mild inflammation at his anastomosis and some patchy erythema throughout the transverse and sigmoid colon. On biopsy, this did demonstrate some active colitis on biopsies. I discussed options with him and suggested possibly adding further immunomodulating therapy such as methotrexate. That being said he was very reluctant to start methotrexate and did not want to move forward with this as he previously said he did not tolerate it well when he had been treated with this as a teenager. He was also being seen by Rheumatology for his skeletal pain symptoms and ultimately was recommended he start sulfasalazine. Apparently he was started on this medication but subsequently lost the prescription and stopped it. When I last saw him he was going to call the rheumatologist about restarting this but apparently this did not occur and he never restarted this medication.  In January 2020 he developed some worsening abdominal pain at his ventral hernia site. He went to [facility] and had a CT scan which demonstrated some enteritis adjacent to the ventral hernia. The imaging was discussed with Radiology and surgery as well as GI input and was ultimately felt this likely represented a Crohn's disease flare as opposed to related to the hernia itself. He was therefore admitted and started on prednisone. He was discharged on 5 days of prednisone 40 mg and followed up with me in clinic and was doing better overall.  He was moving to [place] for semester and we had discussed getting a 2nd opinion regarding his inflammatory bowel disease and he did establish care with [facility] and saw Dr. Kumar there.  It was recommended his Entyvio be increased to every 6 weeks.

## 2023-06-13 NOTE — SERVICENOTES
16 minutes were spent interviewing and examining the patient, discussing the plan of care, and performing documentation required for today's visit.

## 2023-07-10 ENCOUNTER — OFFICE (OUTPATIENT)
Dept: URBAN - METROPOLITAN AREA CLINIC 11 | Facility: CLINIC | Age: 23
End: 2023-07-10
Payer: COMMERCIAL

## 2023-07-10 VITALS
TEMPERATURE: 98.6 F | SYSTOLIC BLOOD PRESSURE: 133 MMHG | TEMPERATURE: 98.3 F | HEART RATE: 83 BPM | SYSTOLIC BLOOD PRESSURE: 126 MMHG | DIASTOLIC BLOOD PRESSURE: 82 MMHG | DIASTOLIC BLOOD PRESSURE: 77 MMHG | HEIGHT: 71 IN | RESPIRATION RATE: 18 BRPM | DIASTOLIC BLOOD PRESSURE: 68 MMHG | HEART RATE: 68 BPM | SYSTOLIC BLOOD PRESSURE: 139 MMHG

## 2023-07-10 DIAGNOSIS — K50.90 CROHN'S DISEASE, UNSPECIFIED, WITHOUT COMPLICATIONS: ICD-10-CM

## 2023-07-10 PROCEDURE — 96413 CHEMO IV INFUSION 1 HR: CPT | Performed by: INTERNAL MEDICINE

## 2023-07-10 NOTE — SERVICEHPINOTES
See follow up visit from  6/13/2023   .  brDate / Results of last TB test  9/28/2022   -   Negative  brLabs and/or Additional orders: CBC and CMP due in NovemberbrInsurance authorization:[facility] Approved PA for continuation of Entyvio 10/26/2210/25/23 (BB)brPharmacy:  Buy and Bill  brRate of Infusion:  Standard   
br   Infusion order placed on:  9/27/2022   
br

## 2023-08-21 ENCOUNTER — OFFICE (OUTPATIENT)
Dept: URBAN - METROPOLITAN AREA CLINIC 11 | Facility: CLINIC | Age: 23
End: 2023-08-21
Payer: COMMERCIAL

## 2023-08-21 VITALS
HEART RATE: 62 BPM | HEART RATE: 64 BPM | DIASTOLIC BLOOD PRESSURE: 77 MMHG | TEMPERATURE: 97.2 F | HEART RATE: 61 BPM | TEMPERATURE: 98.3 F | SYSTOLIC BLOOD PRESSURE: 124 MMHG | RESPIRATION RATE: 18 BRPM | SYSTOLIC BLOOD PRESSURE: 132 MMHG | HEIGHT: 71 IN | SYSTOLIC BLOOD PRESSURE: 128 MMHG | DIASTOLIC BLOOD PRESSURE: 78 MMHG | DIASTOLIC BLOOD PRESSURE: 76 MMHG

## 2023-08-21 DIAGNOSIS — K50.90 CROHN'S DISEASE, UNSPECIFIED, WITHOUT COMPLICATIONS: ICD-10-CM

## 2023-08-21 PROCEDURE — 96413 CHEMO IV INFUSION 1 HR: CPT | Performed by: INTERNAL MEDICINE

## 2023-08-21 NOTE — SERVICEHPINOTES
See follow up visit from 6/13/2023 .brDate / Results of last TB test 9/28/2022 - Negative (drawn today)brLabs and/or Additional orders: CBC, CMP due in NovemberbrInsurance authorization: [employer] approved continuation of Entyvio 7/10/23-1/15/24 (BB)  brPharmacy: Buy and BillbrRate of Infusion: StandardbrInfusion order placed on: 9/27/2022

## 2023-10-02 ENCOUNTER — OFFICE (OUTPATIENT)
Dept: URBAN - METROPOLITAN AREA CLINIC 11 | Facility: CLINIC | Age: 23
End: 2023-10-02
Payer: COMMERCIAL

## 2023-10-02 VITALS
HEIGHT: 71 IN | HEART RATE: 64 BPM | DIASTOLIC BLOOD PRESSURE: 79 MMHG | HEART RATE: 68 BPM | DIASTOLIC BLOOD PRESSURE: 70 MMHG | SYSTOLIC BLOOD PRESSURE: 137 MMHG | SYSTOLIC BLOOD PRESSURE: 147 MMHG | HEART RATE: 66 BPM | DIASTOLIC BLOOD PRESSURE: 77 MMHG | TEMPERATURE: 98.3 F | TEMPERATURE: 98.2 F | SYSTOLIC BLOOD PRESSURE: 132 MMHG

## 2023-10-02 DIAGNOSIS — K50.90 CROHN'S DISEASE, UNSPECIFIED, WITHOUT COMPLICATIONS: ICD-10-CM

## 2023-10-02 PROCEDURE — 96413 CHEMO IV INFUSION 1 HR: CPT | Performed by: INTERNAL MEDICINE

## 2023-10-02 NOTE — SERVICEHPINOTES
See follow up visit from 6/13/2023 .brDate / Results of last TB test 8/21/2023 - Negative.brLabs and/or Additional orders: CBC, CMP due in NovemberbrInsurance authorization: [employer] approved continuation of Entyvio 7/10/23-1/15/24 (BB) brPharmacy: Buy and BillbrRate of Infusion: StandardbrInfusion order placed on: 9/27/2022

## 2023-11-13 ENCOUNTER — OFFICE (OUTPATIENT)
Dept: URBAN - METROPOLITAN AREA CLINIC 11 | Facility: CLINIC | Age: 23
End: 2023-11-13
Payer: COMMERCIAL

## 2023-11-13 VITALS
SYSTOLIC BLOOD PRESSURE: 127 MMHG | DIASTOLIC BLOOD PRESSURE: 77 MMHG | DIASTOLIC BLOOD PRESSURE: 78 MMHG | HEART RATE: 90 BPM | SYSTOLIC BLOOD PRESSURE: 125 MMHG | RESPIRATION RATE: 18 BRPM | TEMPERATURE: 98.4 F | DIASTOLIC BLOOD PRESSURE: 76 MMHG | SYSTOLIC BLOOD PRESSURE: 126 MMHG | HEART RATE: 87 BPM | HEART RATE: 89 BPM | HEIGHT: 71 IN | TEMPERATURE: 98.6 F

## 2023-11-13 DIAGNOSIS — K50.90 CROHN'S DISEASE, UNSPECIFIED, WITHOUT COMPLICATIONS: ICD-10-CM

## 2023-11-13 PROCEDURE — 96413 CHEMO IV INFUSION 1 HR: CPT | Performed by: INTERNAL MEDICINE

## 2023-11-13 NOTE — SERVICEHPINOTES
See follow up visit from 6/13/2023 .brDate / Results of last TB test 8/21/2023 - Negative.brLabs and/or Additional orders: CBC, CMP due in May 2024brInsurance authorization: [employer] approved continuation of Entyvio 7/10/23-1/15/24 (BB)brPharmacy: Buy and BillbrRate of Infusion: StandardbrInfusion order placed on: 11/13/2023

## 2023-12-19 ENCOUNTER — OFFICE (OUTPATIENT)
Dept: URBAN - METROPOLITAN AREA CLINIC 11 | Facility: CLINIC | Age: 23
End: 2023-12-19

## 2023-12-19 VITALS
DIASTOLIC BLOOD PRESSURE: 87 MMHG | HEIGHT: 71 IN | SYSTOLIC BLOOD PRESSURE: 152 MMHG | WEIGHT: 186.6 LBS | OXYGEN SATURATION: 99 % | HEART RATE: 120 BPM

## 2023-12-19 DIAGNOSIS — K92.1 MELENA: ICD-10-CM

## 2023-12-19 DIAGNOSIS — R19.7 DIARRHEA, UNSPECIFIED: ICD-10-CM

## 2023-12-19 DIAGNOSIS — K50.90 CROHN'S DISEASE, UNSPECIFIED, WITHOUT COMPLICATIONS: ICD-10-CM

## 2023-12-19 PROCEDURE — 99214 OFFICE O/P EST MOD 30 MIN: CPT | Performed by: INTERNAL MEDICINE

## 2023-12-19 RX ORDER — PREDNISONE 10 MG/1
TABLET ORAL
Qty: 112 | Refills: 0 | Status: ACTIVE
Start: 2023-12-19

## 2023-12-19 RX ORDER — MESALAMINE 1000 MG/1
SUPPOSITORY RECTAL
Qty: 14 | Refills: 1 | Status: COMPLETED
Start: 2023-12-19 | End: 2024-07-16

## 2023-12-19 RX ORDER — SODIUM PICOSULFATE, MAGNESIUM OXIDE, AND ANHYDROUS CITRIC ACID 10; 3.5; 12 MG/160ML; G/160ML; G/160ML
LIQUID ORAL
Qty: 350 | Refills: 0 | Status: COMPLETED
Start: 2023-12-19 | End: 2024-02-07

## 2023-12-21 ENCOUNTER — OFFICE (OUTPATIENT)
Dept: URBAN - METROPOLITAN AREA CLINIC 11 | Facility: CLINIC | Age: 23
End: 2023-12-21
Payer: COMMERCIAL

## 2023-12-21 VITALS
HEART RATE: 83 BPM | DIASTOLIC BLOOD PRESSURE: 85 MMHG | HEART RATE: 82 BPM | TEMPERATURE: 98.6 F | RESPIRATION RATE: 18 BRPM | DIASTOLIC BLOOD PRESSURE: 87 MMHG | HEIGHT: 71 IN | SYSTOLIC BLOOD PRESSURE: 141 MMHG | TEMPERATURE: 97.9 F | DIASTOLIC BLOOD PRESSURE: 95 MMHG | SYSTOLIC BLOOD PRESSURE: 147 MMHG | SYSTOLIC BLOOD PRESSURE: 143 MMHG

## 2023-12-21 DIAGNOSIS — K50.90 CROHN'S DISEASE, UNSPECIFIED, WITHOUT COMPLICATIONS: ICD-10-CM

## 2023-12-21 PROCEDURE — 96413 CHEMO IV INFUSION 1 HR: CPT | Performed by: INTERNAL MEDICINE

## 2023-12-21 NOTE — SERVICEHPINOTES
See follow up visit from 12/19/23.brDate / Results of last TB test 8/21/2023 - Negative.brLabs and/or Additional orders: CBC, CMP due in May 2024brInsurance authorization: [employer] approved continuation of Entyvio 7/10/23-1/15/24 (BB)brPharmacy: Buy and BillbrRate of Infusion: StandardbrInfusion order placed on: 11/13/2023

## 2024-02-01 ENCOUNTER — OFFICE (OUTPATIENT)
Dept: URBAN - METROPOLITAN AREA CLINIC 11 | Facility: CLINIC | Age: 24
End: 2024-02-01
Payer: COMMERCIAL

## 2024-02-01 VITALS
TEMPERATURE: 98 F | DIASTOLIC BLOOD PRESSURE: 68 MMHG | RESPIRATION RATE: 18 BRPM | SYSTOLIC BLOOD PRESSURE: 121 MMHG | SYSTOLIC BLOOD PRESSURE: 122 MMHG | TEMPERATURE: 97.6 F | HEIGHT: 71 IN | DIASTOLIC BLOOD PRESSURE: 58 MMHG | SYSTOLIC BLOOD PRESSURE: 108 MMHG | DIASTOLIC BLOOD PRESSURE: 73 MMHG | HEART RATE: 65 BPM | HEART RATE: 74 BPM | HEART RATE: 69 BPM

## 2024-02-01 DIAGNOSIS — K50.90 CROHN'S DISEASE, UNSPECIFIED, WITHOUT COMPLICATIONS: ICD-10-CM

## 2024-02-01 PROCEDURE — 96413 CHEMO IV INFUSION 1 HR: CPT | Performed by: INTERNAL MEDICINE

## 2024-02-01 NOTE — SERVICEHPINOTES
See follow up visit from  12/19/2023   .  brDate / Results of last TB test  8/21/2023   -   Negative  brLabs and/or Additional orders: CBC and CMP due in MaybrInsurance authorization:[facility] approved continuation of Entyvio 1/26/24-1/25/25(BB)brPharmacy:  Buy and Bill  brRate of Infusion:  Standard   
br   Infusion order placed on:  11/13/2023   
br

## 2024-02-07 ENCOUNTER — AMBULATORY SURGICAL CENTER (OUTPATIENT)
Dept: URBAN - METROPOLITAN AREA SURGERY 3 | Facility: SURGERY | Age: 24
End: 2024-02-07
Payer: COMMERCIAL

## 2024-02-07 ENCOUNTER — AMBULATORY SURGICAL CENTER (OUTPATIENT)
Dept: URBAN - METROPOLITAN AREA SURGERY 3 | Facility: SURGERY | Age: 24
End: 2024-02-07

## 2024-02-07 ENCOUNTER — OFFICE (OUTPATIENT)
Dept: URBAN - METROPOLITAN AREA PATHOLOGY 12 | Facility: PATHOLOGY | Age: 24
End: 2024-02-07
Payer: COMMERCIAL

## 2024-02-07 VITALS
SYSTOLIC BLOOD PRESSURE: 118 MMHG | DIASTOLIC BLOOD PRESSURE: 73 MMHG | SYSTOLIC BLOOD PRESSURE: 118 MMHG | HEART RATE: 92 BPM | RESPIRATION RATE: 20 BRPM | SYSTOLIC BLOOD PRESSURE: 125 MMHG | DIASTOLIC BLOOD PRESSURE: 61 MMHG | TEMPERATURE: 98.1 F | HEART RATE: 84 BPM | SYSTOLIC BLOOD PRESSURE: 119 MMHG | RESPIRATION RATE: 17 BRPM | WEIGHT: 175 LBS | HEART RATE: 84 BPM | SYSTOLIC BLOOD PRESSURE: 110 MMHG | DIASTOLIC BLOOD PRESSURE: 81 MMHG | DIASTOLIC BLOOD PRESSURE: 76 MMHG | HEART RATE: 96 BPM | HEART RATE: 100 BPM | SYSTOLIC BLOOD PRESSURE: 121 MMHG | DIASTOLIC BLOOD PRESSURE: 61 MMHG | HEART RATE: 90 BPM | HEIGHT: 71 IN | DIASTOLIC BLOOD PRESSURE: 72 MMHG | SYSTOLIC BLOOD PRESSURE: 110 MMHG | OXYGEN SATURATION: 99 % | WEIGHT: 175 LBS | SYSTOLIC BLOOD PRESSURE: 118 MMHG | HEART RATE: 97 BPM | SYSTOLIC BLOOD PRESSURE: 121 MMHG | OXYGEN SATURATION: 99 % | HEART RATE: 96 BPM | HEART RATE: 96 BPM | HEART RATE: 97 BPM | RESPIRATION RATE: 14 BRPM | HEART RATE: 90 BPM | DIASTOLIC BLOOD PRESSURE: 72 MMHG | DIASTOLIC BLOOD PRESSURE: 73 MMHG | RESPIRATION RATE: 17 BRPM | DIASTOLIC BLOOD PRESSURE: 77 MMHG | HEART RATE: 100 BPM | DIASTOLIC BLOOD PRESSURE: 76 MMHG | RESPIRATION RATE: 20 BRPM | SYSTOLIC BLOOD PRESSURE: 125 MMHG | HEART RATE: 90 BPM | RESPIRATION RATE: 20 BRPM | RESPIRATION RATE: 14 BRPM | SYSTOLIC BLOOD PRESSURE: 110 MMHG | DIASTOLIC BLOOD PRESSURE: 73 MMHG | DIASTOLIC BLOOD PRESSURE: 61 MMHG | RESPIRATION RATE: 16 BRPM | RESPIRATION RATE: 18 BRPM | TEMPERATURE: 97.8 F | DIASTOLIC BLOOD PRESSURE: 77 MMHG | SYSTOLIC BLOOD PRESSURE: 125 MMHG | RESPIRATION RATE: 16 BRPM | HEART RATE: 92 BPM | RESPIRATION RATE: 17 BRPM | HEIGHT: 71 IN | SYSTOLIC BLOOD PRESSURE: 121 MMHG | OXYGEN SATURATION: 99 % | WEIGHT: 175 LBS | HEART RATE: 97 BPM | DIASTOLIC BLOOD PRESSURE: 77 MMHG | RESPIRATION RATE: 16 BRPM | HEART RATE: 100 BPM | DIASTOLIC BLOOD PRESSURE: 81 MMHG | DIASTOLIC BLOOD PRESSURE: 76 MMHG | TEMPERATURE: 97.8 F | HEIGHT: 71 IN | SYSTOLIC BLOOD PRESSURE: 119 MMHG | HEART RATE: 84 BPM | TEMPERATURE: 98.1 F | RESPIRATION RATE: 18 BRPM | TEMPERATURE: 98.1 F | HEART RATE: 92 BPM | RESPIRATION RATE: 14 BRPM | TEMPERATURE: 97.8 F | RESPIRATION RATE: 18 BRPM | SYSTOLIC BLOOD PRESSURE: 119 MMHG | DIASTOLIC BLOOD PRESSURE: 72 MMHG | DIASTOLIC BLOOD PRESSURE: 81 MMHG

## 2024-02-07 DIAGNOSIS — K50.90 CROHN'S DISEASE, UNSPECIFIED, WITHOUT COMPLICATIONS: ICD-10-CM

## 2024-02-07 DIAGNOSIS — K63.89 OTHER SPECIFIED DISEASES OF INTESTINE: ICD-10-CM

## 2024-02-07 DIAGNOSIS — K63.3 ULCER OF INTESTINE: ICD-10-CM

## 2024-02-07 PROCEDURE — 88305 TISSUE EXAM BY PATHOLOGIST: CPT | Performed by: STUDENT IN AN ORGANIZED HEALTH CARE EDUCATION/TRAINING PROGRAM

## 2024-02-07 PROCEDURE — 45380 COLONOSCOPY AND BIOPSY: CPT | Performed by: INTERNAL MEDICINE

## 2024-02-07 PROCEDURE — 88342 IMHCHEM/IMCYTCHM 1ST ANTB: CPT | Performed by: STUDENT IN AN ORGANIZED HEALTH CARE EDUCATION/TRAINING PROGRAM

## 2024-02-12 LAB
GASTRO ONE PATHOLOGY: PDF REPORT: (no result)

## 2024-03-08 ENCOUNTER — OFFICE (OUTPATIENT)
Dept: URBAN - METROPOLITAN AREA CLINIC 11 | Facility: CLINIC | Age: 24
End: 2024-03-08
Payer: COMMERCIAL

## 2024-03-08 VITALS
HEART RATE: 94 BPM | HEART RATE: 96 BPM | DIASTOLIC BLOOD PRESSURE: 70 MMHG | TEMPERATURE: 98.1 F | SYSTOLIC BLOOD PRESSURE: 130 MMHG | HEART RATE: 88 BPM | DIASTOLIC BLOOD PRESSURE: 78 MMHG | SYSTOLIC BLOOD PRESSURE: 139 MMHG | SYSTOLIC BLOOD PRESSURE: 122 MMHG | TEMPERATURE: 97.7 F | HEIGHT: 71 IN | RESPIRATION RATE: 18 BRPM | DIASTOLIC BLOOD PRESSURE: 76 MMHG

## 2024-03-08 DIAGNOSIS — K50.90 CROHN'S DISEASE, UNSPECIFIED, WITHOUT COMPLICATIONS: ICD-10-CM

## 2024-03-08 PROCEDURE — 96413 CHEMO IV INFUSION 1 HR: CPT | Performed by: INTERNAL MEDICINE

## 2024-03-08 NOTE — SERVICEHPINOTES
See follow up visit from  12/19/2023   .  brDate / Results of last TB test  8/21/2023   -   Negative  brLabs and/or Additional orders: CBC &amp CMP due in May brInsurance authorization: [employer] approved continuation of Entyvio 1/26/24-1/25/25(BB)brPharmacy:  Buy and Bill  brRate of Infusion:  Standard   
br   Infusion order placed on:  11/13/2023   
br

## 2024-04-02 ENCOUNTER — OFFICE (OUTPATIENT)
Dept: URBAN - METROPOLITAN AREA CLINIC 11 | Facility: CLINIC | Age: 24
End: 2024-04-02
Payer: COMMERCIAL

## 2024-04-02 VITALS
HEIGHT: 71 IN | HEART RATE: 81 BPM | HEART RATE: 92 BPM | SYSTOLIC BLOOD PRESSURE: 130 MMHG | TEMPERATURE: 98.2 F | DIASTOLIC BLOOD PRESSURE: 76 MMHG | DIASTOLIC BLOOD PRESSURE: 77 MMHG | TEMPERATURE: 98 F | SYSTOLIC BLOOD PRESSURE: 115 MMHG | SYSTOLIC BLOOD PRESSURE: 120 MMHG | RESPIRATION RATE: 18 BRPM

## 2024-04-02 DIAGNOSIS — K50.90 CROHN'S DISEASE, UNSPECIFIED, WITHOUT COMPLICATIONS: ICD-10-CM

## 2024-04-02 PROCEDURE — 96413 CHEMO IV INFUSION 1 HR: CPT | Performed by: INTERNAL MEDICINE

## 2024-04-02 NOTE — SERVICEHPINOTES
See follow up visit from  12/19/2023   .  brDate / Results of last TB test  8/21/2023   -   Negative  brLabs and/or Additional orders: CBC &amp CMP due in May brInsurance authorization: [employer] approved Q 4w Entyvio 3/15/24-3/15/25(BB)brPharmacy:  Buy and Bill  brRate of Infusion:  Standard   
br   Infusion order placed on:  3/15/2024   
br

## 2024-04-29 ENCOUNTER — OFFICE (OUTPATIENT)
Dept: URBAN - METROPOLITAN AREA CLINIC 11 | Facility: CLINIC | Age: 24
End: 2024-04-29
Payer: COMMERCIAL

## 2024-04-29 VITALS
HEART RATE: 90 BPM | HEART RATE: 67 BPM | DIASTOLIC BLOOD PRESSURE: 80 MMHG | TEMPERATURE: 97.5 F | DIASTOLIC BLOOD PRESSURE: 66 MMHG | HEIGHT: 71 IN | SYSTOLIC BLOOD PRESSURE: 114 MMHG | HEART RATE: 81 BPM | SYSTOLIC BLOOD PRESSURE: 122 MMHG | RESPIRATION RATE: 18 BRPM | TEMPERATURE: 98.1 F | DIASTOLIC BLOOD PRESSURE: 73 MMHG

## 2024-04-29 DIAGNOSIS — K50.90 CROHN'S DISEASE, UNSPECIFIED, WITHOUT COMPLICATIONS: ICD-10-CM

## 2024-04-29 PROCEDURE — 96413 CHEMO IV INFUSION 1 HR: CPT | Performed by: INTERNAL MEDICINE

## 2024-04-29 NOTE — SERVICEHPINOTES
See follow up visit from 12/19/2023 .brDate / Results of last TB test 8/21/2023 -  NegativebrLabs and/or Additional orders: CBC &amp CMP due in May brInsurance authorization: [employer] approved Q 4w Entyvio 3/15/24-3/15/25(BB)brPharmacy: Buy and BillbrRate of Infusion: StandardbrInfusion order placed on: 3/15/2024 
br   Infusion Consent Date: 04/02/24

## 2024-05-24 ENCOUNTER — OFFICE (OUTPATIENT)
Dept: URBAN - METROPOLITAN AREA CLINIC 11 | Facility: CLINIC | Age: 24
End: 2024-05-24
Payer: COMMERCIAL

## 2024-05-24 VITALS
SYSTOLIC BLOOD PRESSURE: 115 MMHG | DIASTOLIC BLOOD PRESSURE: 67 MMHG | HEART RATE: 76 BPM | HEIGHT: 71 IN | RESPIRATION RATE: 18 BRPM | HEART RATE: 70 BPM | SYSTOLIC BLOOD PRESSURE: 109 MMHG | TEMPERATURE: 98 F | SYSTOLIC BLOOD PRESSURE: 113 MMHG | DIASTOLIC BLOOD PRESSURE: 65 MMHG | TEMPERATURE: 98.3 F | HEART RATE: 77 BPM

## 2024-05-24 DIAGNOSIS — K50.90 CROHN'S DISEASE, UNSPECIFIED, WITHOUT COMPLICATIONS: ICD-10-CM

## 2024-05-24 PROCEDURE — 96413 CHEMO IV INFUSION 1 HR: CPT | Performed by: INTERNAL MEDICINE

## 2024-05-24 NOTE — SERVICEHPINOTES
See follow up visit from 12/19/2023 .brDate / Results of last TB test 8/21/2023 - NegativebrLabs and/or Additional orders: CBC &amp CMP due in November. Drawn today.brInsurance authorization: [employer] approved Q 4w Entyvio 3/15/24-3/15/25(BB)brPharmacy: Buy and BillbrRate of Infusion: StandardbrInfusion order placed on: 3/15/2024brInfusion Consent Date: 04/02/24

## 2024-05-24 NOTE — SERVICENOTES
This is a recent snapshot of the patient's [facility] medical record.  For current drug dose and complete information and questions, call [phone] or In Basket pool, [facility] home infusion ([id number])  CSN Number:  [id number]     Kenroy Ambrose on 6/24/24 at 8:30am.

## 2024-06-21 ENCOUNTER — OFFICE (OUTPATIENT)
Dept: URBAN - METROPOLITAN AREA CLINIC 11 | Facility: CLINIC | Age: 24
End: 2024-06-21
Payer: COMMERCIAL

## 2024-06-21 VITALS
RESPIRATION RATE: 18 BRPM | TEMPERATURE: 98.8 F | HEART RATE: 98 BPM | HEART RATE: 90 BPM | DIASTOLIC BLOOD PRESSURE: 72 MMHG | TEMPERATURE: 98 F | SYSTOLIC BLOOD PRESSURE: 117 MMHG | DIASTOLIC BLOOD PRESSURE: 70 MMHG | SYSTOLIC BLOOD PRESSURE: 132 MMHG | HEIGHT: 71 IN | SYSTOLIC BLOOD PRESSURE: 128 MMHG | DIASTOLIC BLOOD PRESSURE: 75 MMHG

## 2024-06-21 DIAGNOSIS — K50.90 CROHN'S DISEASE, UNSPECIFIED, WITHOUT COMPLICATIONS: ICD-10-CM

## 2024-06-21 PROCEDURE — 96413 CHEMO IV INFUSION 1 HR: CPT | Performed by: INTERNAL MEDICINE

## 2024-06-21 NOTE — SERVICEHPINOTES
See follow up visit from 12/19/2023 .brDate / Results of last TB test 8/21/2023 - NegativebrLabs and/or Additional orders: CBC &amp CMP due in November.brInsurance authorization: [employer] approved Q 4w Entyvio 3/15/24-3/15/25(BB)brPharmacy: Buy and BillbrRate of Infusion: StandardbrInfusion order placed on: 3/15/2024brInfusion Consent Date: 04/02/24

## 2024-07-16 ENCOUNTER — OFFICE (OUTPATIENT)
Dept: URBAN - METROPOLITAN AREA CLINIC 11 | Facility: CLINIC | Age: 24
End: 2024-07-16
Payer: COMMERCIAL

## 2024-07-16 VITALS
HEIGHT: 71 IN | HEART RATE: 95 BPM | SYSTOLIC BLOOD PRESSURE: 121 MMHG | WEIGHT: 174.6 LBS | OXYGEN SATURATION: 97 % | DIASTOLIC BLOOD PRESSURE: 84 MMHG

## 2024-07-16 DIAGNOSIS — K92.1 MELENA: ICD-10-CM

## 2024-07-16 DIAGNOSIS — T45.4X5A ADVERSE EFFECT OF IRON AND ITS COMPOUNDS, INITIAL ENCOUNTER: ICD-10-CM

## 2024-07-16 DIAGNOSIS — K50.90 CROHN'S DISEASE, UNSPECIFIED, WITHOUT COMPLICATIONS: ICD-10-CM

## 2024-07-16 DIAGNOSIS — D50.9 IRON DEFICIENCY ANEMIA, UNSPECIFIED: ICD-10-CM

## 2024-07-16 PROCEDURE — 99214 OFFICE O/P EST MOD 30 MIN: CPT | Performed by: INTERNAL MEDICINE

## 2024-07-22 ENCOUNTER — OFFICE (OUTPATIENT)
Dept: URBAN - METROPOLITAN AREA CLINIC 11 | Facility: CLINIC | Age: 24
End: 2024-07-22
Payer: COMMERCIAL

## 2024-07-22 VITALS
TEMPERATURE: 98.9 F | HEIGHT: 71 IN | HEART RATE: 95 BPM | DIASTOLIC BLOOD PRESSURE: 80 MMHG | RESPIRATION RATE: 18 BRPM | TEMPERATURE: 98.4 F | DIASTOLIC BLOOD PRESSURE: 72 MMHG | HEART RATE: 85 BPM | SYSTOLIC BLOOD PRESSURE: 127 MMHG | HEART RATE: 97 BPM | SYSTOLIC BLOOD PRESSURE: 121 MMHG | SYSTOLIC BLOOD PRESSURE: 124 MMHG | DIASTOLIC BLOOD PRESSURE: 70 MMHG

## 2024-07-22 DIAGNOSIS — K50.90 CROHN'S DISEASE, UNSPECIFIED, WITHOUT COMPLICATIONS: ICD-10-CM

## 2024-07-22 PROCEDURE — 96413 CHEMO IV INFUSION 1 HR: CPT | Performed by: INTERNAL MEDICINE

## 2024-07-22 NOTE — SERVICEHPINOTES
See follow up visit from 7/16/2024.brDate / Results of last TB test 8/21/2023 - NegativebrLabs and/or Additional orders: CBC &amp CMP due in November.brInsurance authorization: [employer] approved Q 4w Entyvio 3/15/24-3/15/25(BB)brPharmacy: Buy and BillbrRate of Infusion: StandardbrInfusion order placed on: 3/15/2024brInfusion Consent Date: 04/02/24

## 2024-07-29 LAB
CBC, PLATELET, NO DIFFERENTIAL: HEMATOCRIT: 46.8 % (ref 37.5–51)
CBC, PLATELET, NO DIFFERENTIAL: HEMOGLOBIN: 14.2 G/DL (ref 13–17.7)
CBC, PLATELET, NO DIFFERENTIAL: MCH: 26.7 PG (ref 26.6–33)
CBC, PLATELET, NO DIFFERENTIAL: MCHC: 30.3 G/DL — LOW (ref 31.5–35.7)
CBC, PLATELET, NO DIFFERENTIAL: MCV: 88 FL (ref 79–97)
CBC, PLATELET, NO DIFFERENTIAL: PLATELETS: 297 X10E3/UL (ref 150–450)
CBC, PLATELET, NO DIFFERENTIAL: RBC: 5.31 X10E6/UL (ref 4.14–5.8)
CBC, PLATELET, NO DIFFERENTIAL: RDW: 13.6 % (ref 11.6–15.4)
CBC, PLATELET, NO DIFFERENTIAL: WBC: 10.5 X10E3/UL (ref 3.4–10.8)
FERRITIN: 12 NG/ML — LOW (ref 30–400)
INFLIXIMAB DRUG + ANTIBODY: ANTI-INFLIXIMAB ANTIBODY: <22 NG/ML
INFLIXIMAB DRUG + ANTIBODY: INFLIXIMAB DRUG LEVEL: <0.4 UG/ML
IRON AND TIBC: IRON BIND.CAP.(TIBC): 460 UG/DL — HIGH (ref 250–450)
IRON AND TIBC: IRON SATURATION: 8 % — CRITICAL LOW (ref 15–55)
IRON AND TIBC: IRON: 35 UG/DL — LOW (ref 38–169)
IRON AND TIBC: UIBC: 425 UG/DL — HIGH (ref 111–343)
PDF REPORT: PDF REPORT1: (no result)
SERIAL MONITORING: PDF: (no result)
VEDOLIZUMAB DRUG + ANTIBODY: ANTI-VEDOLIZUMAB ANTIBODY: <25 NG/ML
VEDOLIZUMAB DRUG + ANTIBODY: VEDOLIZUMAB: 58 UG/ML

## 2025-02-21 ENCOUNTER — OFFICE (OUTPATIENT)
Dept: URBAN - METROPOLITAN AREA CLINIC 11 | Facility: CLINIC | Age: 25
End: 2025-02-21

## 2025-02-21 VITALS
SYSTOLIC BLOOD PRESSURE: 144 MMHG | HEART RATE: 98 BPM | SYSTOLIC BLOOD PRESSURE: 150 MMHG | HEIGHT: 71 IN | WEIGHT: 195 LBS | DIASTOLIC BLOOD PRESSURE: 88 MMHG | OXYGEN SATURATION: 100 % | DIASTOLIC BLOOD PRESSURE: 93 MMHG

## 2025-02-21 DIAGNOSIS — D50.9 IRON DEFICIENCY ANEMIA, UNSPECIFIED: ICD-10-CM

## 2025-02-21 DIAGNOSIS — M25.50 PAIN IN UNSPECIFIED JOINT: ICD-10-CM

## 2025-02-21 DIAGNOSIS — K50.90 CROHN'S DISEASE, UNSPECIFIED, WITHOUT COMPLICATIONS: ICD-10-CM

## 2025-02-21 PROCEDURE — 99214 OFFICE O/P EST MOD 30 MIN: CPT | Performed by: INTERNAL MEDICINE

## 2025-02-21 RX ORDER — SODIUM SULFATE, POTASSIUM SULFATE, MAGNESIUM SULFATE 1.6; 3.13; 17.5 G/ML; G/ML; G/ML
SOLUTION, CONCENTRATE ORAL
Qty: 1 | Refills: 0 | Status: ACTIVE
Start: 2025-02-21

## 2025-02-22 LAB
CBC, PLATELET, NO DIFFERENTIAL: HEMATOCRIT: 41.2 % (ref 37.5–51)
CBC, PLATELET, NO DIFFERENTIAL: HEMOGLOBIN: 14.4 G/DL (ref 13–17.7)
CBC, PLATELET, NO DIFFERENTIAL: MCH: 33.1 PG — HIGH (ref 26.6–33)
CBC, PLATELET, NO DIFFERENTIAL: MCHC: 35 G/DL (ref 31.5–35.7)
CBC, PLATELET, NO DIFFERENTIAL: MCV: 95 FL (ref 79–97)
CBC, PLATELET, NO DIFFERENTIAL: PLATELETS: 279 X10E3/UL (ref 150–450)
CBC, PLATELET, NO DIFFERENTIAL: RBC: 4.35 X10E6/UL (ref 4.14–5.8)
CBC, PLATELET, NO DIFFERENTIAL: RDW: 14.3 % (ref 11.6–15.4)
CBC, PLATELET, NO DIFFERENTIAL: WBC: 5.2 X10E3/UL (ref 3.4–10.8)
COMP. METABOLIC PANEL (14): ALBUMIN: 4.8 G/DL (ref 4.3–5.2)
COMP. METABOLIC PANEL (14): ALKALINE PHOSPHATASE: 58 IU/L (ref 44–121)
COMP. METABOLIC PANEL (14): ALT (SGPT): 25 IU/L (ref 0–44)
COMP. METABOLIC PANEL (14): AST (SGOT): 35 IU/L (ref 0–40)
COMP. METABOLIC PANEL (14): BILIRUBIN, TOTAL: 0.6 MG/DL (ref 0–1.2)
COMP. METABOLIC PANEL (14): BUN/CREATININE RATIO: 13 (ref 9–20)
COMP. METABOLIC PANEL (14): BUN: 13 MG/DL (ref 6–20)
COMP. METABOLIC PANEL (14): CALCIUM: 10.3 MG/DL — HIGH (ref 8.7–10.2)
COMP. METABOLIC PANEL (14): CARBON DIOXIDE, TOTAL: 24 MMOL/L (ref 20–29)
COMP. METABOLIC PANEL (14): CHLORIDE: 102 MMOL/L (ref 96–106)
COMP. METABOLIC PANEL (14): CREATININE: 0.99 MG/DL (ref 0.76–1.27)
COMP. METABOLIC PANEL (14): EGFR: 107 ML/MIN/1.73 (ref 59–?)
COMP. METABOLIC PANEL (14): GLOBULIN, TOTAL: 2.3 G/DL (ref 1.5–4.5)
COMP. METABOLIC PANEL (14): GLUCOSE: 85 MG/DL (ref 70–99)
COMP. METABOLIC PANEL (14): POTASSIUM: 4.4 MMOL/L (ref 3.5–5.2)
COMP. METABOLIC PANEL (14): PROTEIN, TOTAL: 7.1 G/DL (ref 6–8.5)
COMP. METABOLIC PANEL (14): SODIUM: 141 MMOL/L (ref 134–144)
FE+TIBC+FER: FERRITIN: 38 NG/ML (ref 30–400)
FE+TIBC+FER: IRON BIND.CAP.(TIBC): 424 UG/DL (ref 250–450)
FE+TIBC+FER: IRON SATURATION: 23 % (ref 15–55)
FE+TIBC+FER: IRON: 99 UG/DL (ref 38–169)
FE+TIBC+FER: UIBC: 325 UG/DL (ref 111–343)
LIPID PANEL: CHOLESTEROL, TOTAL: 209 MG/DL — HIGH (ref 100–199)
LIPID PANEL: HDL CHOLESTEROL: 68 MG/DL (ref 39–?)
LIPID PANEL: LDL CHOL CALC (NIH): 110 MG/DL — HIGH (ref 0–99)
LIPID PANEL: TRIGLYCERIDES: 183 MG/DL — HIGH (ref 0–149)
LIPID PANEL: VLDL CHOLESTEROL CAL: 31 MG/DL (ref 5–40)

## 2025-04-07 ENCOUNTER — AMBULATORY SURGICAL CENTER (OUTPATIENT)
Dept: URBAN - METROPOLITAN AREA SURGERY 3 | Facility: SURGERY | Age: 25
End: 2025-04-07
Payer: COMMERCIAL

## 2025-04-07 ENCOUNTER — OFFICE (OUTPATIENT)
Dept: URBAN - METROPOLITAN AREA PATHOLOGY 12 | Facility: PATHOLOGY | Age: 25
End: 2025-04-07
Payer: COMMERCIAL

## 2025-04-07 VITALS
HEART RATE: 70 BPM | SYSTOLIC BLOOD PRESSURE: 102 MMHG | RESPIRATION RATE: 20 BRPM | OXYGEN SATURATION: 100 % | RESPIRATION RATE: 16 BRPM | TEMPERATURE: 98.4 F | DIASTOLIC BLOOD PRESSURE: 66 MMHG | RESPIRATION RATE: 16 BRPM | SYSTOLIC BLOOD PRESSURE: 105 MMHG | RESPIRATION RATE: 24 BRPM | DIASTOLIC BLOOD PRESSURE: 56 MMHG | RESPIRATION RATE: 23 BRPM | TEMPERATURE: 98 F | RESPIRATION RATE: 20 BRPM | HEART RATE: 73 BPM | TEMPERATURE: 98 F | SYSTOLIC BLOOD PRESSURE: 92 MMHG | DIASTOLIC BLOOD PRESSURE: 61 MMHG | DIASTOLIC BLOOD PRESSURE: 66 MMHG | DIASTOLIC BLOOD PRESSURE: 56 MMHG | HEART RATE: 73 BPM | DIASTOLIC BLOOD PRESSURE: 63 MMHG | OXYGEN SATURATION: 97 % | HEIGHT: 71 IN | OXYGEN SATURATION: 100 % | SYSTOLIC BLOOD PRESSURE: 102 MMHG | HEART RATE: 93 BPM | RESPIRATION RATE: 23 BRPM | SYSTOLIC BLOOD PRESSURE: 97 MMHG | TEMPERATURE: 98.4 F | SYSTOLIC BLOOD PRESSURE: 118 MMHG | HEART RATE: 70 BPM | HEART RATE: 66 BPM | RESPIRATION RATE: 18 BRPM | HEART RATE: 75 BPM | SYSTOLIC BLOOD PRESSURE: 97 MMHG | HEART RATE: 66 BPM | RESPIRATION RATE: 24 BRPM | HEART RATE: 93 BPM | HEIGHT: 71 IN | RESPIRATION RATE: 18 BRPM | SYSTOLIC BLOOD PRESSURE: 92 MMHG | WEIGHT: 185.8 LBS | WEIGHT: 185.8 LBS | SYSTOLIC BLOOD PRESSURE: 118 MMHG | OXYGEN SATURATION: 96 % | DIASTOLIC BLOOD PRESSURE: 63 MMHG | SYSTOLIC BLOOD PRESSURE: 105 MMHG | DIASTOLIC BLOOD PRESSURE: 61 MMHG | HEART RATE: 75 BPM | OXYGEN SATURATION: 97 % | OXYGEN SATURATION: 96 %

## 2025-04-07 DIAGNOSIS — K50.90 CROHN'S DISEASE, UNSPECIFIED, WITHOUT COMPLICATIONS: ICD-10-CM

## 2025-04-07 DIAGNOSIS — K63.89 OTHER SPECIFIED DISEASES OF INTESTINE: ICD-10-CM

## 2025-04-07 DIAGNOSIS — K63.3 ULCER OF INTESTINE: ICD-10-CM

## 2025-04-07 PROBLEM — Z87.898 SURVEILLANCE DUE TO INFLAMMATORY BOWEL DISEASE: Status: ACTIVE | Noted: 2025-04-07

## 2025-04-07 PROCEDURE — 88342 IMHCHEM/IMCYTCHM 1ST ANTB: CPT | Performed by: PATHOLOGY

## 2025-04-07 PROCEDURE — 88305 TISSUE EXAM BY PATHOLOGIST: CPT | Performed by: PATHOLOGY

## 2025-04-07 PROCEDURE — 45380 COLONOSCOPY AND BIOPSY: CPT | Performed by: INTERNAL MEDICINE

## 2025-04-10 LAB
GASTRO ONE PATHOLOGY: PDF REPORT: (no result)
GASTRO ONE PATHOLOGY: PDF REPORT: (no result)

## 2025-06-17 ENCOUNTER — OFFICE (OUTPATIENT)
Dept: URBAN - METROPOLITAN AREA CLINIC 11 | Facility: CLINIC | Age: 25
End: 2025-06-17
Payer: COMMERCIAL

## 2025-06-17 VITALS
SYSTOLIC BLOOD PRESSURE: 147 MMHG | DIASTOLIC BLOOD PRESSURE: 88 MMHG | OXYGEN SATURATION: 99 % | HEIGHT: 71 IN | HEART RATE: 105 BPM | DIASTOLIC BLOOD PRESSURE: 92 MMHG | WEIGHT: 191 LBS | SYSTOLIC BLOOD PRESSURE: 162 MMHG

## 2025-06-17 DIAGNOSIS — K50.90 CROHN'S DISEASE, UNSPECIFIED, WITHOUT COMPLICATIONS: ICD-10-CM

## 2025-06-17 DIAGNOSIS — D50.9 IRON DEFICIENCY ANEMIA, UNSPECIFIED: ICD-10-CM

## 2025-06-17 PROCEDURE — 99214 OFFICE O/P EST MOD 30 MIN: CPT | Performed by: INTERNAL MEDICINE

## 2025-06-19 LAB
CBC, PLATELET, NO DIFFERENTIAL: HEMATOCRIT: 43.2 % (ref 37.5–51)
CBC, PLATELET, NO DIFFERENTIAL: HEMOGLOBIN: 14.1 G/DL (ref 13–17.7)
CBC, PLATELET, NO DIFFERENTIAL: MCH: 31.6 PG (ref 26.6–33)
CBC, PLATELET, NO DIFFERENTIAL: MCHC: 32.6 G/DL (ref 31.5–35.7)
CBC, PLATELET, NO DIFFERENTIAL: MCV: 97 FL (ref 79–97)
CBC, PLATELET, NO DIFFERENTIAL: PLATELETS: 237 X10E3/UL (ref 150–450)
CBC, PLATELET, NO DIFFERENTIAL: RBC: 4.46 X10E6/UL (ref 4.14–5.8)
CBC, PLATELET, NO DIFFERENTIAL: RDW: 12.3 % (ref 11.6–15.4)
CBC, PLATELET, NO DIFFERENTIAL: WBC: 6.1 X10E3/UL (ref 3.4–10.8)
COMP. METABOLIC PANEL (14): ALBUMIN: 4.7 G/DL (ref 4.3–5.2)
COMP. METABOLIC PANEL (14): ALKALINE PHOSPHATASE: 64 IU/L (ref 44–121)
COMP. METABOLIC PANEL (14): ALT (SGPT): 27 IU/L (ref 0–44)
COMP. METABOLIC PANEL (14): AST (SGOT): 46 IU/L — HIGH (ref 0–40)
COMP. METABOLIC PANEL (14): BILIRUBIN, TOTAL: 0.7 MG/DL (ref 0–1.2)
COMP. METABOLIC PANEL (14): BUN/CREATININE RATIO: 12 (ref 9–20)
COMP. METABOLIC PANEL (14): BUN: 11 MG/DL (ref 6–20)
COMP. METABOLIC PANEL (14): CALCIUM: 10.1 MG/DL (ref 8.7–10.2)
COMP. METABOLIC PANEL (14): CARBON DIOXIDE, TOTAL: 21 MMOL/L (ref 20–29)
COMP. METABOLIC PANEL (14): CHLORIDE: 102 MMOL/L (ref 96–106)
COMP. METABOLIC PANEL (14): CREATININE: 0.92 MG/DL (ref 0.76–1.27)
COMP. METABOLIC PANEL (14): EGFR: 117 ML/MIN/1.73 (ref 59–?)
COMP. METABOLIC PANEL (14): GLOBULIN, TOTAL: 2.3 G/DL (ref 1.5–4.5)
COMP. METABOLIC PANEL (14): GLUCOSE: 89 MG/DL (ref 70–99)
COMP. METABOLIC PANEL (14): POTASSIUM: 4.4 MMOL/L (ref 3.5–5.2)
COMP. METABOLIC PANEL (14): PROTEIN, TOTAL: 7 G/DL (ref 6–8.5)
COMP. METABOLIC PANEL (14): SODIUM: 140 MMOL/L (ref 134–144)
FE+TIBC+FER: FERRITIN: 24 NG/ML — LOW (ref 30–400)
FE+TIBC+FER: IRON BIND.CAP.(TIBC): 418 UG/DL (ref 250–450)
FE+TIBC+FER: IRON SATURATION: 32 % (ref 15–55)
FE+TIBC+FER: IRON: 133 UG/DL (ref 38–169)
FE+TIBC+FER: UIBC: 285 UG/DL (ref 111–343)
QUANTIFERON-TB GOLD PLUS: NEGATIVE
QUANTIFERON-TB GOLD PLUS: QUANTIFERON CRITERIA: (no result)
QUANTIFERON-TB GOLD PLUS: QUANTIFERON INCUBATION: (no result)
QUANTIFERON-TB GOLD PLUS: QUANTIFERON MITOGEN VALUE: >10 IU/ML
QUANTIFERON-TB GOLD PLUS: QUANTIFERON NIL VALUE: 0.09 IU/ML
QUANTIFERON-TB GOLD PLUS: QUANTIFERON TB1 AG VALUE: 0.13 IU/ML
QUANTIFERON-TB GOLD PLUS: QUANTIFERON TB2 AG VALUE: 0.08 IU/ML